# Patient Record
Sex: MALE | Race: WHITE | NOT HISPANIC OR LATINO | Employment: FULL TIME | URBAN - METROPOLITAN AREA
[De-identification: names, ages, dates, MRNs, and addresses within clinical notes are randomized per-mention and may not be internally consistent; named-entity substitution may affect disease eponyms.]

---

## 2020-10-31 ENCOUNTER — RECORDS - HEALTHEAST (OUTPATIENT)
Dept: LAB | Facility: CLINIC | Age: 50
End: 2020-10-31

## 2020-10-31 LAB
SARS-COV-2 PCR COMMENT: NORMAL
SARS-COV-2 RNA SPEC QL NAA+PROBE: NEGATIVE
SARS-COV-2 VIRUS SPECIMEN SOURCE: NORMAL

## 2021-08-04 ENCOUNTER — HOSPITAL ENCOUNTER (EMERGENCY)
Facility: HOSPITAL | Age: 51
Discharge: HOME | End: 2021-08-04
Attending: EMERGENCY MEDICINE | Admitting: EMERGENCY MEDICINE
Payer: COMMERCIAL

## 2021-08-04 ENCOUNTER — APPOINTMENT (OUTPATIENT)
Dept: RADIOLOGY | Facility: HOSPITAL | Age: 51
End: 2021-08-04
Attending: EMERGENCY MEDICINE
Payer: COMMERCIAL

## 2021-08-04 VITALS
BODY MASS INDEX: 43.42 KG/M2 | TEMPERATURE: 98.7 F | WEIGHT: 294 LBS | DIASTOLIC BLOOD PRESSURE: 81 MMHG | OXYGEN SATURATION: 95 % | SYSTOLIC BLOOD PRESSURE: 147 MMHG | RESPIRATION RATE: 25 BRPM | HEART RATE: 79 BPM

## 2021-08-04 DIAGNOSIS — S61.432A GUNSHOT WOUND OF LEFT HAND, INITIAL ENCOUNTER: ICD-10-CM

## 2021-08-04 DIAGNOSIS — S62.327A CLOSED DISPLACED FRACTURE OF SHAFT OF FIFTH METACARPAL BONE OF LEFT HAND, INITIAL ENCOUNTER: ICD-10-CM

## 2021-08-04 PROCEDURE — 99285 EMERGENCY DEPT VISIT HI MDM: CPT

## 2021-08-04 PROCEDURE — 250N000011 HC RX IP 250 OP 636: Performed by: EMERGENCY MEDICINE

## 2021-08-04 PROCEDURE — 29125 APPL SHORT ARM SPLINT STATIC: CPT | Mod: LT

## 2021-08-04 PROCEDURE — 99285 EMERGENCY DEPT VISIT HI MDM: CPT | Mod: 25

## 2021-08-04 PROCEDURE — 73130 X-RAY EXAM OF HAND: CPT | Mod: LT

## 2021-08-04 PROCEDURE — 250N000013 HC RX MED GY IP 250 OP 250 PS 637: Performed by: EMERGENCY MEDICINE

## 2021-08-04 PROCEDURE — 96372 THER/PROPH/DIAG INJ SC/IM: CPT | Performed by: EMERGENCY MEDICINE

## 2021-08-04 RX ORDER — ACETAMINOPHEN 325 MG/1
975 TABLET ORAL ONCE
Status: COMPLETED | OUTPATIENT
Start: 2021-08-04 | End: 2021-08-04

## 2021-08-04 RX ORDER — IBUPROFEN 200 MG
400 TABLET ORAL ONCE
Status: COMPLETED | OUTPATIENT
Start: 2021-08-04 | End: 2021-08-04

## 2021-08-04 RX ORDER — CEPHALEXIN 500 MG/1
500 CAPSULE ORAL 2 TIMES DAILY
Qty: 14 CAPSULE | Refills: 0 | Status: SHIPPED | OUTPATIENT
Start: 2021-08-04 | End: 2024-03-26

## 2021-08-04 RX ORDER — OXYCODONE HYDROCHLORIDE 5 MG/1
5 TABLET ORAL ONCE
Status: COMPLETED | OUTPATIENT
Start: 2021-08-04 | End: 2021-08-04

## 2021-08-04 RX ORDER — MORPHINE SULFATE 4 MG/ML
8 INJECTION, SOLUTION INTRAMUSCULAR; INTRAVENOUS ONCE
Status: COMPLETED | OUTPATIENT
Start: 2021-08-04 | End: 2021-08-04

## 2021-08-04 RX ORDER — OXYCODONE HYDROCHLORIDE 5 MG/1
5 TABLET ORAL EVERY 6 HOURS PRN
Qty: 12 TABLET | Refills: 0 | Status: SHIPPED | OUTPATIENT
Start: 2021-08-04 | End: 2021-08-07

## 2021-08-04 RX ADMIN — ACETAMINOPHEN 975 MG: 325 TABLET ORAL at 19:05

## 2021-08-04 RX ADMIN — OXYCODONE HYDROCHLORIDE 5 MG: 5 TABLET ORAL at 19:05

## 2021-08-04 RX ADMIN — MORPHINE SULFATE 8 MG: 4 INJECTION, SOLUTION INTRAMUSCULAR; INTRAVENOUS at 19:46

## 2021-08-04 RX ADMIN — IBUPROFEN 400 MG: 200 TABLET, FILM COATED ORAL at 19:05
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
## 2021-10-25 ENCOUNTER — OFFICE VISIT (OUTPATIENT)
Dept: FAMILY MEDICINE | Facility: CLINIC | Age: 51
End: 2021-10-25
Payer: COMMERCIAL

## 2021-10-25 VITALS
HEART RATE: 80 BPM | RESPIRATION RATE: 24 BRPM | DIASTOLIC BLOOD PRESSURE: 87 MMHG | SYSTOLIC BLOOD PRESSURE: 138 MMHG | WEIGHT: 306 LBS | BODY MASS INDEX: 45.32 KG/M2 | HEIGHT: 69 IN

## 2021-10-25 DIAGNOSIS — Z23 ENCOUNTER FOR IMMUNIZATION: Primary | ICD-10-CM

## 2021-10-25 PROCEDURE — 99202 OFFICE O/P NEW SF 15 MIN: CPT | Performed by: FAMILY MEDICINE

## 2021-10-25 RX ORDER — EPINEPHRINE 0.3 MG/.3ML
0.3 INJECTION SUBCUTANEOUS
COMMUNITY
Start: 2021-01-05 | End: 2024-06-24

## 2021-10-25 RX ORDER — ASPIRIN 81 MG
TABLET,CHEWABLE ORAL
COMMUNITY
Start: 2021-01-05 | End: 2024-03-05

## 2021-10-25 RX ORDER — ROSUVASTATIN CALCIUM 20 MG/1
20 TABLET, COATED ORAL
COMMUNITY
Start: 2021-01-13 | End: 2024-03-05

## 2021-10-25 RX ORDER — ATORVASTATIN CALCIUM 80 MG/1
TABLET, FILM COATED ORAL
COMMUNITY
Start: 2021-01-05 | End: 2024-03-22

## 2021-10-25 RX ORDER — METOPROLOL SUCCINATE 25 MG/1
TABLET, EXTENDED RELEASE ORAL
COMMUNITY
Start: 2021-01-05 | End: 2021-10-25

## 2021-10-25 RX ORDER — HYDROCHLOROTHIAZIDE 25 MG/1
25 TABLET ORAL
COMMUNITY
Start: 2021-01-07 | End: 2024-03-05

## 2021-10-25 RX ORDER — LOSARTAN POTASSIUM 100 MG/1
100 TABLET ORAL
COMMUNITY
Start: 2021-01-07 | End: 2024-03-05

## 2021-10-25 ASSESSMENT — MIFFLIN-ST. JEOR: SCORE: 2230.45

## 2021-10-27 NOTE — PROGRESS NOTES
"OFFICE VISIT - FAMILY MEDICINE     ASSESSMENT AND PLAN       ICD-10-CM    1. Encounter for immunization  Z23    New patient, he is here because he would like to get a COVID-19 exemption form,He works for [employer], COVID-19 vaccine has been mandated, I explained to him that I am just seeing him for the first time, I am not in a good position to make a judgment or feel up an exemption form, I offered him a consult from one of our allergist immunologist to determine if his history of allergies could be a contraindication from getting the COVID-19 vaccine, patient was disappointed to hear that, he stated that he is just going to  walk out of of work or   follow-up with his previous primary care physician at [facility] to discuss further.     CHIEF COMPLAINT   Immunization (needs update, and second covid. work with [employer])       HPI   Cayetano Palmer is a 51 year old male.  No Patient Care Coordination Note on file.    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  He is stating that he did get the COVID-19 vaccine first dose on December 21, 2020, he did get sick from the vaccine, he was having joint pain, body ache, on January 4 he was seen in the ER for chest pain and had a  heart attack, patient stating it was related to his COVID-19 vaccine.  He stated that his primary care physician told him not to get the second dose of the COVID-19 vaccine.  He also mentioned multiple allergies as he was growing up, has been seen by allergist, was told that he has environmental allergies, He does mention multiple incidents of \"weird episode of allergies\" where he skin turns warm and red, all of these since receiving the COVID-19 last December.  He does have an EpiPen.  Did not use " "the EpiPen after the COVID-19 vaccine.  He also has hypertension, hyperlipidemia, obstructive sleep apnea.    Review of Systems As per HPI, otherwise negative.    OBJECTIVE   /87 (BP Location: Left arm, Patient Position: Sitting, Cuff Size: Adult Large)   Pulse 80   Resp 24   Ht 1.74 m (5' 8.5\")   Wt 138.8 kg (306 lb)   BMI 45.85 kg/m    Physical Exam  Vitals and nursing note reviewed.     No physical exam performed today.    PFSH   No family history on file.  Social History     Socioeconomic History     Marital status:      Spouse name: Not on file     Number of children: Not on file     Years of education: Not on file     Highest education level: Not on file   Occupational History     Not on file   Tobacco Use     Smoking status: Current Every Day Smoker     Types: Cigarettes     Smokeless tobacco: Never Used   Substance and Sexual Activity     Alcohol use: Not on file     Drug use: Not on file     Sexual activity: Not on file   Other Topics Concern     Not on file   Social History Narrative     Not on file     Social Determinants of Health     Financial Resource Strain:      Difficulty of Paying Living Expenses:    Food Insecurity:      Worried About Running Out of Food in the Last Year:      Ran Out of Food in the Last Year:    Transportation Needs:      Lack of Transportation (Medical):      Lack of Transportation (Non-Medical):    Physical Activity:      Days of Exercise per Week:      Minutes of Exercise per Session:    Stress:      Feeling of Stress :    Social Connections:      Frequency of Communication with Friends and Family:      Frequency of Social Gatherings with Friends and Family:      Attends Jew Services:      Active Member of Clubs or Organizations:      Attends Club or Organization Meetings:      Marital Status:    Intimate Partner Violence:      Fear of Current or Ex-Partner:      Emotionally Abused:      Physically Abused:      Sexually Abused:        PMS "   [unfilled]  No past surgical history on file.    RESULTS/CONSULTS (Lab/Rad)   No results found for this or any previous visit (from the past 168 hour(s)).     [unfilled]    HEALTH MAINTENANCE / SCREENING   [unfilled], [unfilled],[unfilled]  Immunization History   Administered Date(s) Administered     COVID-19,PF,Pfizer 12/21/2020     FLU 6-35 months 11/02/2016     Flu, Unspecified 10/01/2018     Influenza (IIV3) PF 10/07/2015     Influenza Vaccine IM > 6 months Valent IIV4 (Alfuria,Fluzone) 09/27/2017     Tdap (Adult) Unspecified Formulation 09/18/2006     Health Maintenance   Topic     PREVENTIVE CARE VISIT      ANNUAL REVIEW OF  ORDERS      ADVANCE CARE PLANNING      Pneumococcal Vaccine: Pediatrics (0 to 5 Years) and At-Risk Patients (6 to 64 Years) (1 of 2 - PPSV23)     COLORECTAL CANCER SCREENING      HIV SCREENING      HEPATITIS C SCREENING      LIPID      DTAP/TDAP/TD IMMUNIZATION (2 - Td or Tdap)     COVID-19 Vaccine (2 - Pfizer 2-dose series)     INFLUENZA VACCINE (1)     ZOSTER IMMUNIZATION (1 of 2)     PHQ-2      IPV IMMUNIZATION      MENINGITIS IMMUNIZATION      HEPATITIS B IMMUNIZATION      Review of external notes as documented elsewhere in note  25 minutes spent on the date of the encounter doing chart review, review of outside records, review of test results, interpretation of tests, patient visit and documentation          Filipe Gary MD  [facility]   This transcription uses voice recognition software, which may contain typographical errors.

## 2024-02-26 ENCOUNTER — APPOINTMENT (OUTPATIENT)
Dept: RADIOLOGY | Facility: HOSPITAL | Age: 54
End: 2024-02-26
Attending: EMERGENCY MEDICINE

## 2024-02-26 ENCOUNTER — HOSPITAL ENCOUNTER (EMERGENCY)
Facility: HOSPITAL | Age: 54
Discharge: HOME | End: 2024-02-26
Attending: EMERGENCY MEDICINE | Admitting: EMERGENCY MEDICINE

## 2024-02-26 VITALS
TEMPERATURE: 98.2 F | BODY MASS INDEX: 45.32 KG/M2 | RESPIRATION RATE: 12 BRPM | HEART RATE: 76 BPM | SYSTOLIC BLOOD PRESSURE: 157 MMHG | OXYGEN SATURATION: 96 % | WEIGHT: 306 LBS | DIASTOLIC BLOOD PRESSURE: 90 MMHG | HEIGHT: 69 IN

## 2024-02-26 DIAGNOSIS — S99.921A INJURY OF TOE ON RIGHT FOOT, INITIAL ENCOUNTER: ICD-10-CM

## 2024-02-26 PROCEDURE — 99284 EMERGENCY DEPT VISIT MOD MDM: CPT | Mod: 25

## 2024-02-26 PROCEDURE — 250N000013 HC RX MED GY IP 250 OP 250 PS 637: Performed by: STUDENT IN AN ORGANIZED HEALTH CARE EDUCATION/TRAINING PROGRAM

## 2024-02-26 PROCEDURE — 73660 X-RAY EXAM OF TOE(S): CPT | Mod: LT

## 2024-02-26 PROCEDURE — 99284 EMERGENCY DEPT VISIT MOD MDM: CPT

## 2024-02-26 RX ORDER — ACETAMINOPHEN 325 MG/1
975 TABLET ORAL ONCE
Status: COMPLETED | OUTPATIENT
Start: 2024-02-26 | End: 2024-02-26

## 2024-02-26 RX ADMIN — ACETAMINOPHEN 975 MG: 325 TABLET ORAL at 11:30

## 2024-02-26 ASSESSMENT — COLUMBIA-SUICIDE SEVERITY RATING SCALE - C-SSRS
6. HAVE YOU EVER DONE ANYTHING, STARTED TO DO ANYTHING, OR PREPARED TO DO ANYTHING TO END YOUR LIFE?: NO
1. IN THE PAST MONTH, HAVE YOU WISHED YOU WERE DEAD OR WISHED YOU COULD GO TO SLEEP AND NOT WAKE UP?: NO
2. HAVE YOU ACTUALLY HAD ANY THOUGHTS OF KILLING YOURSELF IN THE PAST MONTH?: NO

## 2024-02-26 ASSESSMENT — ACTIVITIES OF DAILY LIVING (ADL): ADLS_ACUITY_SCORE: 35

## 2024-02-26 NOTE — ED PROVIDER NOTES
EMERGENCY DEPARTMENT ENCOUnter      NAME: Cayetano Palmer  AGE: 53 year old male  YOB: 1970  MRN: [id number]  EVALUATION DATE & TIME: 2024 11:23 AM    PCP: No Ref-Primary, Physician    ED PROVIDER: Kody Brewer DO      Chief Complaint   Patient presents with    Toe Injury         FINAL IMPRESSION:  1. Injury of toe on right foot, initial encounter          ED COURSE & MEDICAL DECISION MAKIN:30 AM I met with the patient for the initial interview and physical examination. Discussed plan for treatment and workup in the ED.     12:19 PM We discussed the plan for discharge and the patient is agreeable. Reviewed supportive cares, symptomatic treatment, outpatient follow up, and reasons to return to the Emergency Department. All questions and concerns were addressed. Patient to be discharged by ED RN.        The patient presented to the emergency department today after suffering a right great toe injury when something fell on it.  The nail was partially avulsed but the base of the nail was still intact and in place.  X-ray does not show any evidence of bony injury.  Plan will be to keep the nail in place and wrapped his toe on a bulky dressing.  He has been referred to podiatry for further evaluation in the next few days.  He is comfortable with this plan.        Medical Decision Making  Obtained supplemental history:Supplemental history obtained?: No  Reviewed external records: External records reviewed?: No  Care impacted by chronic illness:Hyperlipidemia, Hypertension, Smoking / Nicotine Use, and Other: NSTEMI  Care significantly affected by social determinants of health:N/A  Did you consider but not order tests?: Work up considered but not performed and documented in chart, if applicable  Did you interpret images independently?: Independent interpretation of ECG and images noted in documentation, when applicable.  Consultation discussion with other provider:Did you involve another provider  (consultant, MH, pharmacy, etc.)?: No  Discharge. No recommendations on prescription strength medication(s). See documentation for any additional details.    At the conclusion of the encounter I discussed the results of all of the tests and the disposition. The questions were answered. The patient or family acknowledged understanding and was agreeable with the care plan.         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 Notes he took his shoe off and pressed on the toe causing it to bleed. Denies additional injuries.           PAST MEDICAL HISTORY:  No past medical history on file.    PAST SURGICAL HISTORY:  No past surgical history on file.        CURRENT MEDICATIONS:    ASPIRIN LOW DOSE 81 MG chewable tablet  atorvastatin (LIPITOR) 80 MG tablet  cephALEXin (KEFLEX) 500 MG capsule  EPINEPHrine (ANY BX GENERIC EQUIV) 0.3 MG/0.3ML injection 2-pack  hydrochlorothiazide (HYDRODIURIL) 25 MG tablet  losartan (COZAAR) 100 MG tablet  rosuvastatin (CRESTOR) 20 MG tablet        ALLERGIES:  Allergies   Allergen Reactions    Metoprolol Itching     Itching started <1h after taking this med 2x       FAMILY HISTORY:  No family history on file.    SOCIAL HISTORY:   Social History     Socioeconomic History    Marital status:    Tobacco Use    Smoking status: Every Day     Types: Cigarettes    Smokeless tobacco: Never       VITALS:  Patient Vitals for the  "past 24 hrs:   BP Temp Temp src Pulse Resp SpO2 Height Weight   02/26/24 1215 (!) 150/88 -- -- 71 -- 96 % -- --   02/26/24 1120 (!) 181/116 -- -- -- -- -- -- --   02/26/24 1119 -- 98.2  F (36.8  C) Tympanic 85 12 95 % 1.753 m (5' 9\") 138.8 kg (306 lb)       PHYSICAL EXAM    Constitutional:  Well developed, Well nourished,  HENT:  Normocephalic, Atraumatic, Oropharynx moist, Nose normal.   Eyes:  EOMI, Conjunctiva normal, No discharge.   Respiratory:  Normal breath sounds, No respiratory distress, No wheezing, No chest tenderness.   Cardiovascular:  Normal heart rate, Normal rhythm, No murmurs  GI:  Soft, No tenderness, No guarding, No CVA tenderness.   Musculoskeletal: toenail of left great toe is partially avulsed but base of the nail is in place. Mild active bleeding  Extremities: No lower extremity edema.  Neurologic:  Alert & oriented x 3, No focal deficits noted.   Psychiatric:  Affect normal, Judgment normal, Mood normal.          RADIOLOGY:  I have independently reviewed and interpreted the above imaging, pending the final radiology read.  XR Toe Left G/E 2 Views   Preliminary Result   IMPRESSION: Mild osteoarthrosis of the 1st MTP joint. There is no evidence of fracture.            I, Paulina Booth, am serving as a scribe to document services personally performed by Dr. Brewer based on my observation and the provider's statements to me. IKody, DO attest that Paulina Booth is acting in a scribe capacity, has observed my performance of the services and has documented them in accordance with my direction.    Kody Brewer DO  Emergency Medicine  [facility] EMERGENCY DEPARTMENT  [address] [id number] [phone]  Dept: [phone]     Kody Brewer DO  02/26/24 1238    "

## 2024-02-26 NOTE — ED TRIAGE NOTES
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 Also has bleeding around the left big toe where the nail has pulled away. Last tetanus shot 2 yrs ago.

## 2024-02-26 NOTE — DISCHARGE INSTRUCTIONS
Fortunately your x-ray does not show any signs of fracture.  Follow-up with podiatry in the next several days for recheck and return to the ER for any worsening symptoms or other concerns.

## 2024-03-05 ENCOUNTER — OFFICE VISIT (OUTPATIENT)
Dept: FAMILY MEDICINE | Facility: CLINIC | Age: 54
End: 2024-03-05
Payer: COMMERCIAL

## 2024-03-05 ENCOUNTER — APPOINTMENT (OUTPATIENT)
Dept: RADIOLOGY | Facility: HOSPITAL | Age: 54
End: 2024-03-05
Attending: EMERGENCY MEDICINE
Payer: COMMERCIAL

## 2024-03-05 ENCOUNTER — HOSPITAL ENCOUNTER (EMERGENCY)
Facility: HOSPITAL | Age: 54
Discharge: HOME | End: 2024-03-05
Attending: EMERGENCY MEDICINE | Admitting: EMERGENCY MEDICINE
Payer: COMMERCIAL

## 2024-03-05 VITALS
RESPIRATION RATE: 24 BRPM | DIASTOLIC BLOOD PRESSURE: 117 MMHG | SYSTOLIC BLOOD PRESSURE: 185 MMHG | TEMPERATURE: 97.3 F | WEIGHT: 298.06 LBS | BODY MASS INDEX: 45.17 KG/M2 | HEART RATE: 87 BPM | OXYGEN SATURATION: 98 % | HEIGHT: 68 IN

## 2024-03-05 VITALS
SYSTOLIC BLOOD PRESSURE: 192 MMHG | RESPIRATION RATE: 18 BRPM | TEMPERATURE: 98.6 F | OXYGEN SATURATION: 96 % | DIASTOLIC BLOOD PRESSURE: 119 MMHG | BODY MASS INDEX: 44.02 KG/M2 | WEIGHT: 298.1 LBS | HEART RATE: 92 BPM

## 2024-03-05 DIAGNOSIS — R61 DIAPHORESIS: Primary | ICD-10-CM

## 2024-03-05 DIAGNOSIS — R73.9 HYPERGLYCEMIA: ICD-10-CM

## 2024-03-05 DIAGNOSIS — J15.9 COMMUNITY ACQUIRED BACTERIAL PNEUMONIA: ICD-10-CM

## 2024-03-05 DIAGNOSIS — I10 HYPERTENSION, UNSPECIFIED TYPE: ICD-10-CM

## 2024-03-05 LAB
ANION GAP SERPL CALCULATED.3IONS-SCNC: 11 MMOL/L (ref 7–15)
BASE EXCESS BLDV CALC-SCNC: 4.2 MMOL/L (ref -3–3)
BASOPHILS # BLD AUTO: 0.1 10E3/UL (ref 0–0.2)
BASOPHILS NFR BLD AUTO: 1 %
BUN SERPL-MCNC: 14.7 MG/DL (ref 6–20)
CALCIUM SERPL-MCNC: 8.7 MG/DL (ref 8.6–10)
CHLORIDE SERPL-SCNC: 103 MMOL/L (ref 98–107)
CREAT SERPL-MCNC: 0.86 MG/DL (ref 0.67–1.17)
DEPRECATED HCO3 PLAS-SCNC: 25 MMOL/L (ref 22–29)
EGFRCR SERPLBLD CKD-EPI 2021: >90 ML/MIN/1.73M2
EOSINOPHIL # BLD AUTO: 0.2 10E3/UL (ref 0–0.7)
EOSINOPHIL NFR BLD AUTO: 2 %
ERYTHROCYTE [DISTWIDTH] IN BLOOD BY AUTOMATED COUNT: 12.3 % (ref 10–15)
FLUAV RNA SPEC QL NAA+PROBE: NEGATIVE
FLUBV RNA RESP QL NAA+PROBE: NEGATIVE
GLUCOSE SERPL-MCNC: 265 MG/DL (ref 70–99)
HBA1C MFR BLD: 9.9 %
HCO3 BLDV-SCNC: 29 MMOL/L (ref 21–28)
HCT VFR BLD AUTO: 46.2 % (ref 40–53)
HGB BLD-MCNC: 16.1 G/DL (ref 13.3–17.7)
HOLD SPECIMEN: NORMAL
HOLD SPECIMEN: NORMAL
IMM GRANULOCYTES # BLD: 0 10E3/UL
IMM GRANULOCYTES NFR BLD: 0 %
LACTATE SERPL-SCNC: 1.5 MMOL/L (ref 0.7–2)
LYMPHOCYTES # BLD AUTO: 2.5 10E3/UL (ref 0.8–5.3)
LYMPHOCYTES NFR BLD AUTO: 24 %
MAGNESIUM SERPL-MCNC: 2 MG/DL (ref 1.7–2.3)
MCH RBC QN AUTO: 31.3 PG (ref 26.5–33)
MCHC RBC AUTO-ENTMCNC: 34.8 G/DL (ref 31.5–36.5)
MCV RBC AUTO: 90 FL (ref 78–100)
MONOCYTES # BLD AUTO: 1 10E3/UL (ref 0–1.3)
MONOCYTES NFR BLD AUTO: 9 %
NEUTROPHILS # BLD AUTO: 6.6 10E3/UL (ref 1.6–8.3)
NEUTROPHILS NFR BLD AUTO: 64 %
NRBC # BLD AUTO: 0 10E3/UL
NRBC BLD AUTO-RTO: 0 /100
NT-PROBNP SERPL-MCNC: 903 PG/ML (ref 0–900)
O2/TOTAL GAS SETTING VFR VENT: 21 %
OXYHGB MFR BLDV: 69 % (ref 70–75)
PCO2 BLDV: 45 MM HG (ref 40–50)
PH BLDV: 7.41 [PH] (ref 7.32–7.43)
PLATELET # BLD AUTO: 184 10E3/UL (ref 150–450)
PO2 BLDV: 36 MM HG (ref 25–47)
POTASSIUM SERPL-SCNC: 3.8 MMOL/L (ref 3.4–5.3)
PROCALCITONIN SERPL IA-MCNC: 0.09 NG/ML
RBC # BLD AUTO: 5.15 10E6/UL (ref 4.4–5.9)
RSV RNA SPEC NAA+PROBE: NEGATIVE
SAO2 % BLDV: 72.6 % (ref 70–75)
SARS-COV-2 RNA RESP QL NAA+PROBE: NEGATIVE
SODIUM SERPL-SCNC: 139 MMOL/L (ref 135–145)
TROPONIN T SERPL HS-MCNC: 51 NG/L
TROPONIN T SERPL HS-MCNC: 54 NG/L
WBC # BLD AUTO: 10.4 10E3/UL (ref 4–11)

## 2024-03-05 PROCEDURE — 93005 ELECTROCARDIOGRAM TRACING: CPT | Performed by: EMERGENCY MEDICINE

## 2024-03-05 PROCEDURE — 250N000013 HC RX MED GY IP 250 OP 250 PS 637: Performed by: EMERGENCY MEDICINE

## 2024-03-05 PROCEDURE — 85025 COMPLETE CBC W/AUTO DIFF WBC: CPT | Performed by: EMERGENCY MEDICINE

## 2024-03-05 PROCEDURE — 36415 COLL VENOUS BLD VENIPUNCTURE: CPT | Performed by: EMERGENCY MEDICINE

## 2024-03-05 PROCEDURE — 99215 OFFICE O/P EST HI 40 MIN: CPT | Performed by: PHYSICIAN ASSISTANT

## 2024-03-05 PROCEDURE — 83605 ASSAY OF LACTIC ACID: CPT | Performed by: EMERGENCY MEDICINE

## 2024-03-05 PROCEDURE — 87040 BLOOD CULTURE FOR BACTERIA: CPT | Performed by: EMERGENCY MEDICINE

## 2024-03-05 PROCEDURE — 80048 BASIC METABOLIC PNL TOTAL CA: CPT | Performed by: EMERGENCY MEDICINE

## 2024-03-05 PROCEDURE — 87637 SARSCOV2&INF A&B&RSV AMP PRB: CPT | Performed by: EMERGENCY MEDICINE

## 2024-03-05 PROCEDURE — 84484 ASSAY OF TROPONIN QUANT: CPT | Performed by: EMERGENCY MEDICINE

## 2024-03-05 PROCEDURE — 83735 ASSAY OF MAGNESIUM: CPT | Performed by: EMERGENCY MEDICINE

## 2024-03-05 PROCEDURE — 99284 EMERGENCY DEPT VISIT MOD MDM: CPT | Mod: 25

## 2024-03-05 PROCEDURE — 84145 PROCALCITONIN (PCT): CPT | Performed by: EMERGENCY MEDICINE

## 2024-03-05 PROCEDURE — 93005 ELECTROCARDIOGRAM TRACING: CPT | Performed by: PHYSICIAN ASSISTANT

## 2024-03-05 PROCEDURE — 71046 X-RAY EXAM CHEST 2 VIEWS: CPT

## 2024-03-05 PROCEDURE — 83036 HEMOGLOBIN GLYCOSYLATED A1C: CPT | Performed by: EMERGENCY MEDICINE

## 2024-03-05 PROCEDURE — 82805 BLOOD GASES W/O2 SATURATION: CPT | Performed by: EMERGENCY MEDICINE

## 2024-03-05 PROCEDURE — 93010 ELECTROCARDIOGRAM REPORT: CPT | Performed by: INTERNAL MEDICINE

## 2024-03-05 PROCEDURE — 83880 ASSAY OF NATRIURETIC PEPTIDE: CPT | Performed by: EMERGENCY MEDICINE

## 2024-03-05 RX ORDER — DOXYCYCLINE 100 MG/1
100 CAPSULE ORAL 2 TIMES DAILY
Qty: 20 CAPSULE | Refills: 0 | Status: SHIPPED | OUTPATIENT
Start: 2024-03-05 | End: 2024-03-15

## 2024-03-05 RX ORDER — ASPIRIN 81 MG
81 TABLET,CHEWABLE ORAL DAILY
Qty: 20 TABLET | Refills: 0 | Status: SHIPPED | OUTPATIENT
Start: 2024-03-05 | End: 2024-03-22

## 2024-03-05 RX ORDER — HYDROCHLOROTHIAZIDE 25 MG/1
25 TABLET ORAL DAILY
Qty: 20 TABLET | Refills: 0 | Status: SHIPPED | OUTPATIENT
Start: 2024-03-05 | End: 2024-03-22

## 2024-03-05 RX ORDER — LOSARTAN POTASSIUM 100 MG/1
100 TABLET ORAL DAILY
Qty: 20 TABLET | Refills: 0 | Status: SHIPPED | OUTPATIENT
Start: 2024-03-05 | End: 2024-03-22

## 2024-03-05 RX ORDER — ROSUVASTATIN CALCIUM 20 MG/1
20 TABLET, COATED ORAL DAILY
Qty: 20 TABLET | Refills: 0 | Status: SHIPPED | OUTPATIENT
Start: 2024-03-05 | End: 2024-03-22

## 2024-03-05 RX ORDER — DOXYCYCLINE 100 MG/1
100 CAPSULE ORAL ONCE
Status: COMPLETED | OUTPATIENT
Start: 2024-03-05 | End: 2024-03-05

## 2024-03-05 RX ADMIN — AMOXICILLIN AND CLAVULANATE POTASSIUM 1 TABLET: 875; 125 TABLET, FILM COATED ORAL at 20:35

## 2024-03-05 RX ADMIN — DOXYCYCLINE HYCLATE 100 MG: 100 CAPSULE ORAL at 20:35

## 2024-03-05 ASSESSMENT — ACTIVITIES OF DAILY LIVING (ADL)
ADLS_ACUITY_SCORE: 33
ADLS_ACUITY_SCORE: 35

## 2024-03-05 ASSESSMENT — COLUMBIA-SUICIDE SEVERITY RATING SCALE - C-SSRS
6. HAVE YOU EVER DONE ANYTHING, STARTED TO DO ANYTHING, OR PREPARED TO DO ANYTHING TO END YOUR LIFE?: NO
2. HAVE YOU ACTUALLY HAD ANY THOUGHTS OF KILLING YOURSELF IN THE PAST MONTH?: NO
1. IN THE PAST MONTH, HAVE YOU WISHED YOU WERE DEAD OR WISHED YOU COULD GO TO SLEEP AND NOT WAKE UP?: NO

## 2024-03-05 NOTE — PROGRESS NOTES
Patient presents with:  Cough: X over 2wks, productive cough, slight wheezing, no chest pain, congestion, no runny nose, no fever      Clinical Decision Making:  Given patient's shortness of breath and diaphoresis I recommend that he be evaluated at the emergency department for cardiac workup.  They will also be able to do chest x-ray to evaluate for pneumonia.  EKG showed no signs of ST elevation, but did have some nonspecific T wave abnormalities.  Report given to ED provider prior to the patient's arrival.  Patient is agreeable to this plan.      ICD-10-CM    1. Diaphoresis  R61 EKG 12-lead, tracing only          There are no Patient Instructions on file for this visit.    HPI:  Cayetano Palmer is a 53 year old male who presents today complaining of cough and shortness of breath for the past 2 weeks.  Patient denies any nasal congestion, runny nose, chest pain, or fever.  He does get out of breath more recently than normal and feels like he cannot catch his breath.  In 2013 he had a pneumonia and this feels somewhat similar.  At that time he needed a thoracotomy.  About 3 years ago patient also had some neck pain and arm pain that was potentially concerning for cardiac event.  At that time he had borderline troponin and echo was normal.  Other than that he has not had any    History obtained from the patient.    Problem List:  There are no relevant problems documented for this patient.      No past medical history on file.    Social History     Tobacco Use    Smoking status: Every Day     Types: Cigarettes    Smokeless tobacco: Never   Substance Use Topics    Alcohol use: Not on file       Review of Systems    Vitals:    03/05/24 1705   BP: (!) 192/119   Pulse: 92   Resp: 18   Temp: 98.6  F (37  C)   TempSrc: Oral   SpO2: 96%   Weight: 135.2 kg (298 lb 1.6 oz)       Physical Exam  Vitals and nursing note reviewed.   Constitutional:       General: He is not in acute distress.     Appearance: He is diaphoretic  (Worsened as he is talking for long period of time.). He is not toxic-appearing.      Comments: Patient seems to get more out of breath as he talks for longer periods of time.   HENT:      Head: Normocephalic and atraumatic.      Right Ear: External ear normal.      Left Ear: External ear normal.   Eyes:      Conjunctiva/sclera: Conjunctivae normal.   Cardiovascular:      Rate and Rhythm: Normal rate and regular rhythm.      Heart sounds: No murmur heard.  Pulmonary:      Effort: Pulmonary effort is normal. No respiratory distress.      Breath sounds: No stridor. No wheezing, rhonchi or rales.   Neurological:      Mental Status: He is alert.   Psychiatric:         Mood and Affect: Mood normal.         Behavior: Behavior normal.         Thought Content: Thought content normal.         Judgment: Judgment normal.         Results:  Results for orders placed or performed in visit on 03/05/24   EKG 12-lead, tracing only     Status: None (Preliminary result)   Result Value Ref Range    Systolic Blood Pressure  mmHg    Diastolic Blood Pressure  mmHg    Ventricular Rate 93 BPM    Atrial Rate 93 BPM    KS Interval 174 ms    QRS Duration 108 ms     ms    QTc 487 ms    P Axis 30 degrees    R AXIS -72 degrees    T Axis 96 degrees    Interpretation ECG       Sinus rhythm with Premature atrial complexes with Aberrant conduction  Left axis deviation  Inferior infarct , age undetermined  T wave abnormality, consider lateral ischemia  Abnormal ECG  No previous ECGs available           At the end of the encounter, I discussed results, diagnosis, medications. Discussed red flags for immediate return to clinic/ER, as well as indications for follow up if no improvement. Patient understood and agreed to plan. Patient was stable for discharge.

## 2024-03-05 NOTE — ED NOTES
"Expected Patient Referral to ED  5:46 PM    Referring Clinic/Provider:  Walk in clinic [facility]    Reason for referral/Clinical facts:  Cough with SOB, urgent care provider doesn't know if EKG is normal or abnormal, patient with some sweating, /119, \"I didn't do more workup other than the EKG here\".    Recommendations provided:  Send to ED for further evaluation    Caller was informed that this institution does possess the capabilities and/or resources to provide for patient and should be transferred to our facility.    Discussed that if direct admit is sought and any hurdles are encountered, this ED would be happy to see the patient and evaluate.    Informed caller that recommendations provided are recommendations based only on the facts provided and that they responsible to accept or reject the advice, or to seek a formal in person consultation as needed and that this ED will see/treat patient should they arrive.      Vanna Comer MD  [facility] EMERGENCY DEPARTMENT  [address] [place] [id number]  [phone]       Vanna Comer MD  03/05/24 4073    "

## 2024-03-06 LAB
ATRIAL RATE - MUSE: 93 BPM
DIASTOLIC BLOOD PRESSURE - MUSE: NORMAL MMHG
INTERPRETATION ECG - MUSE: NORMAL
P AXIS - MUSE: 30 DEGREES
PR INTERVAL - MUSE: 174 MS
QRS DURATION - MUSE: 108 MS
QT - MUSE: 392 MS
QTC - MUSE: 487 MS
R AXIS - MUSE: -72 DEGREES
SYSTOLIC BLOOD PRESSURE - MUSE: NORMAL MMHG
T AXIS - MUSE: 96 DEGREES
VENTRICULAR RATE- MUSE: 93 BPM

## 2024-03-06 NOTE — ED TRIAGE NOTES
Pt reports being sent here from , not really sure why.  Pt denies CP, states he has been having some SOB and a cough.  Pt went to  for concern for PNA.  Pt states he is supposed to be taking BP medications but hasn't for 3 months as his clinic closed.       Triage Assessment (Adult)       Row Name 03/05/24 1050          Triage Assessment    Airway WDL WDL        Respiratory WDL    Respiratory WDL X;rhythm/pattern     Rhythm/Pattern, Respiratory shortness of breath;tachypneic        Skin Circulation/Temperature WDL    Skin Circulation/Temperature WDL WDL        Cardiac WDL    Cardiac WDL WDL        Peripheral/Neurovascular WDL    Peripheral Neurovascular WDL WDL        Cognitive/Neuro/Behavioral WDL    Cognitive/Neuro/Behavioral WDL WDL        Destiny Coma Scale    Best Eye Response 4-->(E4) spontaneous     Best Motor Response 6-->(M6) obeys commands     Best Verbal Response 5-->(V5) oriented     Destiny Coma Scale Score 15

## 2024-03-06 NOTE — ED PROVIDER NOTES
"EMERGENCY DEPARTMENT ENCOUNTER      NAME: Cayetano Palmer  AGE: 53 year old male  YOB: 1970  MRN: [id number]  EVALUATION DATE & TIME: No admission date for patient encounter.    PCP: No Ref-Primary, Physician    ED PROVIDER: Vanna Comer M.D.      Chief Complaint   Patient presents with    Cough    Breathing Problem         FINAL IMPRESSION:  6:44 PM I met with the patient, obtained history, performed an initial exam, and discussed options and plan for diagnostics and treatment here in the ED.  1. Hypertension, unspecified type    2. Hyperglycemia    3. Community acquired bacterial pneumonia          ED COURSE & MEDICAL DECISION MAKING:    ED Course as of 03/05/24 2107   Tue Mar 05, 2024   1845 Patient reports 2 weeks \"coughing up all sorts of junk\" with normal VS, normal venous blood pH, normal WBC, normal lactic acid, could not catch his full breath today while going up the stairs.  EKG reassuring, pending troponin ACS r/o, home covid19 test negative. CXR independently interpreted by me with spine sign seen with possible CAP, awaiting radiology read. No diaphoresis or appearance of instability here at this time reassuringly. No chest pain at all and no SOB right now, EKG WNL with patient sent from urgent care because urgent care did not know how to interpret EKG, called me to note not sure how to interpret, by review of EKG WNL reassuringly.    1912 Troponin at 1813 = 54, repeat 2h delta troponin ordered at 2015 for ACS r/o. Patient with known DM2 with glucose 265 without signs of DKA with CO2 = 25 and anion gap = 11   1943 Xr with possible atypical PNA, begun on doxycycline antibiotic therapy with augmentin for CAP   2048 Patient reassessed and feeling improved, repeat troponin underway now for ACS r/o and patient engaged in shared clinical decision making conversation and ok with plan to f/u with  clinical referral primary care service and additionally to begin metformin for DM2 with a1c " pending, f/u with PMD for glucose rechecks, resume antihypertensive therapy and statin and daily ASA, with Rx to preferred pharmacy for that along with antibiotic therapy for CAP.    2106 Troponin 54 -> 51 therefore reassuringly ACS r/o. Patient discharged after being provided with extensive anticipatory guidance and given return precautions, importance of PMD follow-up emphasized.        Medical Decision Making  Obtained supplemental history:Supplemental history obtained?: Documented in chart  Reviewed external records: External records reviewed?: Documented in chart  Care impacted by chronic illness:Heart Disease, Hyperlipidemia, Hypertension, Smoking / Nicotine Use, and Other: NSTEMI, prediabetes  Care significantly affected by social determinants of health:N/A  Did you consider but not order tests?: Work up considered but not performed and documented in chart, if applicable  Did you interpret images independently?: Independent interpretation of ECG and images noted in documentation, when applicable.  Consultation discussion with other provider:Did you involve another provider (consultant, , pharmacy, etc.)?: No  Discharge. I prescribed additional prescription strength medication(s) as charted. I considered admission, but discharged patient after significant clinical improvement.      At the conclusion of the encounter I discussed the results of all of the tests and the disposition. The questions were answered. The patient or family acknowledged understanding and was agreeable with the care plan.     MEDICATIONS GIVEN IN THE EMERGENCY:  Medications   amoxicillin-clavulanate (AUGMENTIN) 875-125 MG per tablet 1 tablet (1 tablet Oral $Given 3/5/24 2035)   doxycycline hyclate (VIBRAMYCIN) capsule 100 mg (100 mg Oral $Given 3/5/24 2035)       NEW PRESCRIPTIONS STARTED AT TODAY'S ER VISIT  New Prescriptions    AMOXICILLIN-CLAVULANATE (AUGMENTIN) 875-125 MG TABLET    Take 1 tablet by mouth 2 times daily for 10 days     DOXYCYCLINE HYCLATE (VIBRAMYCIN) 100 MG CAPSULE    Take 1 capsule (100 mg) by mouth 2 times daily for 10 days    METFORMIN (GLUCOPHAGE) 500 MG TABLET    Take 1 tablet (500 mg) by mouth 2 times daily (with meals)          =================================================================    Lists of hospitals in the [place]      Cayetano Palmer is a 53 year old male with PMHx of hypertension, hyperlipidemia, morbid obesity, tobacco abuse, NSTEMI, and obstructive sleep apnea who presents to the ED today via private vehicle with cough and difficulty breathing.    Per chart review, patient visited [facility] on 03- for evaluation of cough, shortness of breath, and slight wheezing. Patient recommended to be evaluated at ED for cardiac workup, given patient's shortness of breath and diaphoresis. Chest x-ray to be done for evaluation for pneumonia. EKG showed no signs of ST elevation, but had some nonspecific T wave abnormalities.  Provider at urgent care spoke with ED provide prior to patient's arrival. Notes that the patient has a cough, and looks sweating. Was unsure how to interpret EKG.    Patient reports having a cough for the past 2 weeks. He is concerned that his symptoms could be pneumoni. He notes that he has intermittent coughing fits. He reports that he had difficulty breathing after going up 5 steps and walking 50 yards today.    Patient denies any recent fever, chest pain, or leg swelling. He also denies any recent illness exposure.            REVIEW OF SYSTEMS   All other systems reviewed and are negative except as noted above in HPI.    PAST MEDICAL HISTORY:  No past medical history on file.    PAST SURGICAL HISTORY:  No past surgical history on file.    CURRENT MEDICATIONS:    amoxicillin-clavulanate (AUGMENTIN) 875-125 MG tablet  ASPIRIN LOW DOSE 81 MG chewable tablet  doxycycline hyclate (VIBRAMYCIN) 100 MG capsule  hydrochlorothiazide (HYDRODIURIL) 25 MG tablet  losartan (COZAAR) 100 MG tablet  metFORMIN  "(GLUCOPHAGE) 500 MG tablet  rosuvastatin (CRESTOR) 20 MG tablet  atorvastatin (LIPITOR) 80 MG tablet  cephALEXin (KEFLEX) 500 MG capsule  EPINEPHrine (ANY BX GENERIC EQUIV) 0.3 MG/0.3ML injection 2-pack        ALLERGIES:  Allergies   Allergen Reactions    Metoprolol Itching     Itching started <1h after taking this med 2x       FAMILY HISTORY:  No family history on file.    SOCIAL HISTORY:   Social History     Socioeconomic History    Marital status:    Tobacco Use    Smoking status: Every Day     Types: Cigarettes    Smokeless tobacco: Never       VITALS:  Patient Vitals for the past 24 hrs:   BP Temp Temp src Pulse Resp SpO2 Height Weight   03/05/24 1900 (!) 186/120 -- -- -- -- -- -- --   03/05/24 1800 (!) 193/107 97.3  F (36.3  C) Temporal 100 24 93 % 1.727 m (5' 8\") 135.2 kg (298 lb 1 oz)       PHYSICAL EXAM    GENERAL: Awake, alert.  In no acute distress.   HEENT: Normocephalic, atraumatic.  Pupils equal, round and reactive.  Conjunctiva normal.  EOMI.  NECK: No stridor or apparent deformity.  PULMONARY: Symmetrical breath sounds without distress.  Lungs clear to auscultation bilaterally without wheezes, rhonchi or rales.  CARDIO: Regular rate and rhythm.  No significant murmur, rub or gallop.  Radial pulses strong and symmetrical.  ABDOMINAL: Abdomen soft, non-distended and non-tender to palpation.  No CVAT, no palpable hepatosplenomegaly.  EXTREMITIES: No lower extremity swelling or edema.    NEURO: Alert and oriented to person, place and time.  Cranial nerves grossly intact.  No focal motor deficit.  PSYCH: Normal mood and affect  SKIN: No rashes      LAB:  All pertinent labs reviewed and interpreted.  Results for orders placed or performed during the hospital encounter of 03/05/24   XR Chest 2 Views    Impression    IMPRESSION:     Diffusely increased interstitial markings throughout the lungs, which could reflect interstitial edema and/or atypical infection. No focal airspace opacities. No pleural " effusions or pneumothorax.    Borderline enlarged cardiac silhouette.   Basic metabolic panel   Result Value Ref Range    Sodium 139 135 - 145 mmol/L    Potassium 3.8 3.4 - 5.3 mmol/L    Chloride 103 98 - 107 mmol/L    Carbon Dioxide (CO2) 25 22 - 29 mmol/L    Anion Gap 11 7 - 15 mmol/L    Urea Nitrogen 14.7 6.0 - 20.0 mg/dL    Creatinine 0.86 0.67 - 1.17 mg/dL    GFR Estimate >90 >60 mL/min/1.73m2    Calcium 8.7 8.6 - 10.0 mg/dL    Glucose 265 (H) 70 - 99 mg/dL   Result Value Ref Range    Troponin T, High Sensitivity 54 (H) <=22 ng/L   CBC with platelets and differential   Result Value Ref Range    WBC Count 10.4 4.0 - 11.0 10e3/uL    RBC Count 5.15 4.40 - 5.90 10e6/uL    Hemoglobin 16.1 13.3 - 17.7 g/dL    Hematocrit 46.2 40.0 - 53.0 %    MCV 90 78 - 100 fL    MCH 31.3 26.5 - 33.0 pg    MCHC 34.8 31.5 - 36.5 g/dL    RDW 12.3 10.0 - 15.0 %    Platelet Count 184 150 - 450 10e3/uL    % Neutrophils 64 %    % Lymphocytes 24 %    % Monocytes 9 %    % Eosinophils 2 %    % Basophils 1 %    % Immature Granulocytes 0 %    NRBCs per 100 WBC 0 <1 /100    Absolute Neutrophils 6.6 1.6 - 8.3 10e3/uL    Absolute Lymphocytes 2.5 0.8 - 5.3 10e3/uL    Absolute Monocytes 1.0 0.0 - 1.3 10e3/uL    Absolute Eosinophils 0.2 0.0 - 0.7 10e3/uL    Absolute Basophils 0.1 0.0 - 0.2 10e3/uL    Absolute Immature Granulocytes 0.0 <=0.4 10e3/uL    Absolute NRBCs 0.0 10e3/uL   Extra Blue Top Tube   Result Value Ref Range    Hold Specimen JIC    Extra Red Top Tube   Result Value Ref Range    Hold Specimen JIC    Result Value Ref Range    Procalcitonin 0.09 <0.50 ng/mL   Lactic acid whole blood   Result Value Ref Range    Lactic Acid 1.5 0.7 - 2.0 mmol/L   Result Value Ref Range    Magnesium 2.0 1.7 - 2.3 mg/dL   Nt probnp inpatient (BNP)   Result Value Ref Range    N terminal Pro BNP Inpatient 903 (H) 0 - 900 pg/mL   Symptomatic Influenza A/B, RSV, & SARS-CoV2 PCR (COVID-19) Nasopharyngeal    Specimen: Nasopharyngeal; Swab   Result Value Ref Range     Influenza A PCR Negative Negative    Influenza B PCR Negative Negative    RSV PCR Negative Negative    SARS CoV2 PCR Negative Negative   Blood gas venous   Result Value Ref Range    pH Venous 7.41 7.32 - 7.43    pCO2 Venous 45 40 - 50 mm Hg    pO2 Venous 36 25 - 47 mm Hg    Bicarbonate Venous 29 (H) 21 - 28 mmol/L    Base Excess/Deficit Venous 4.2 (H) -3.0 - 3.0 mmol/L    FIO2 21     Oxyhemoglobin Venous 69 (L) 70 - 75 %    O2 Sat, Venous 72.6 70.0 - 75.0 %   Result Value Ref Range    Troponin T, High Sensitivity 51 (H) <=22 ng/L   Result Value Ref Range    Hemoglobin A1C 9.9 (H) <5.7 %       RADIOLOGY:  Reviewed all pertinent imaging. Please see official radiology report.  XR Chest 2 Views   Final Result   IMPRESSION:       Diffusely increased interstitial markings throughout the lungs, which could reflect interstitial edema and/or atypical infection. No focal airspace opacities. No pleural effusions or pneumothorax.      Borderline enlarged cardiac silhouette.            EKG:    Reviewed and interpreted as:   03-  18:06  90 bpm  Sinus rhythm  No ST abnormalities  No EKG prior      I have independently reviewed and interpreted the EKG(s) documented above.        I, ESE ORY , am serving as a scribe to document services personally performed by Dr. Vanna Comer based on my observation and the provider's statements to me. I, Vanna Comer MD attest that ESE ROY  is acting in a scribe capacity, has observed my performance of the services and has documented them in accordance with my direction.       Vanna Comer MD  03/05/24 1456

## 2024-03-06 NOTE — DISCHARGE INSTRUCTIONS
In addition to re-prescribing your blood pressure and cholesterol medicine in the ER today, we are also starting you on metformin, a medication for diabetes, because you have diabetes. Our primary care medical scheduling team will call you this week to help you set up a follow up appointment so they can make sure you are feeling improved, rapidly improving, and tolerating these medications well and so they can recheck your blood pressure and cholesterol and blood sugar in their office.

## 2024-03-10 LAB
BACTERIA BLD CULT: NO GROWTH
BACTERIA BLD CULT: NO GROWTH

## 2024-03-14 ENCOUNTER — HOSPITAL ENCOUNTER (EMERGENCY)
Facility: HOSPITAL | Age: 54
Discharge: HOME | End: 2024-03-14
Attending: EMERGENCY MEDICINE | Admitting: EMERGENCY MEDICINE
Payer: COMMERCIAL

## 2024-03-14 ENCOUNTER — APPOINTMENT (OUTPATIENT)
Dept: RADIOLOGY | Facility: HOSPITAL | Age: 54
End: 2024-03-14
Attending: EMERGENCY MEDICINE
Payer: COMMERCIAL

## 2024-03-14 ENCOUNTER — APPOINTMENT (OUTPATIENT)
Dept: CT IMAGING | Facility: HOSPITAL | Age: 54
End: 2024-03-14
Attending: EMERGENCY MEDICINE
Payer: COMMERCIAL

## 2024-03-14 VITALS
DIASTOLIC BLOOD PRESSURE: 96 MMHG | OXYGEN SATURATION: 98 % | HEART RATE: 91 BPM | RESPIRATION RATE: 21 BRPM | TEMPERATURE: 97.8 F | BODY MASS INDEX: 44.86 KG/M2 | SYSTOLIC BLOOD PRESSURE: 120 MMHG | HEIGHT: 68 IN | WEIGHT: 296 LBS

## 2024-03-14 DIAGNOSIS — R07.89 ATYPICAL CHEST PAIN: ICD-10-CM

## 2024-03-14 DIAGNOSIS — I48.91 NEW ONSET ATRIAL FIBRILLATION (H): ICD-10-CM

## 2024-03-14 LAB
ALBUMIN SERPL BCG-MCNC: 3.8 G/DL (ref 3.5–5.2)
ALP SERPL-CCNC: 75 U/L (ref 40–150)
ALT SERPL W P-5'-P-CCNC: 43 U/L (ref 0–70)
ANION GAP SERPL CALCULATED.3IONS-SCNC: 12 MMOL/L (ref 7–15)
APTT PPP: 31 SECONDS (ref 22–38)
AST SERPL W P-5'-P-CCNC: 37 U/L (ref 0–45)
ATRIAL RATE - MUSE: 416 BPM
BASOPHILS # BLD AUTO: 0.1 10E3/UL (ref 0–0.2)
BASOPHILS NFR BLD AUTO: 1 %
BILIRUB SERPL-MCNC: 0.4 MG/DL
BUN SERPL-MCNC: 18 MG/DL (ref 6–20)
CALCIUM SERPL-MCNC: 9.4 MG/DL (ref 8.6–10)
CHLORIDE SERPL-SCNC: 102 MMOL/L (ref 98–107)
CREAT SERPL-MCNC: 0.91 MG/DL (ref 0.67–1.17)
DEPRECATED HCO3 PLAS-SCNC: 26 MMOL/L (ref 22–29)
DIASTOLIC BLOOD PRESSURE - MUSE: NORMAL MMHG
EGFRCR SERPLBLD CKD-EPI 2021: >90 ML/MIN/1.73M2
EOSINOPHIL # BLD AUTO: 0.3 10E3/UL (ref 0–0.7)
EOSINOPHIL NFR BLD AUTO: 3 %
ERYTHROCYTE [DISTWIDTH] IN BLOOD BY AUTOMATED COUNT: 12.3 % (ref 10–15)
GLUCOSE SERPL-MCNC: 313 MG/DL (ref 70–99)
HCT VFR BLD AUTO: 46.9 % (ref 40–53)
HGB BLD-MCNC: 16.3 G/DL (ref 13.3–17.7)
HOLD SPECIMEN: NORMAL
IMM GRANULOCYTES # BLD: 0 10E3/UL
IMM GRANULOCYTES NFR BLD: 0 %
INR PPP: 1.1 (ref 0.85–1.15)
INTERPRETATION ECG - MUSE: NORMAL
LYMPHOCYTES # BLD AUTO: 3 10E3/UL (ref 0.8–5.3)
LYMPHOCYTES NFR BLD AUTO: 27 %
MAGNESIUM SERPL-MCNC: 1.9 MG/DL (ref 1.7–2.3)
MCH RBC QN AUTO: 31.1 PG (ref 26.5–33)
MCHC RBC AUTO-ENTMCNC: 34.8 G/DL (ref 31.5–36.5)
MCV RBC AUTO: 90 FL (ref 78–100)
MONOCYTES # BLD AUTO: 0.9 10E3/UL (ref 0–1.3)
MONOCYTES NFR BLD AUTO: 8 %
NEUTROPHILS # BLD AUTO: 6.8 10E3/UL (ref 1.6–8.3)
NEUTROPHILS NFR BLD AUTO: 61 %
NRBC # BLD AUTO: 0 10E3/UL
NRBC BLD AUTO-RTO: 0 /100
P AXIS - MUSE: NORMAL DEGREES
PLATELET # BLD AUTO: 217 10E3/UL (ref 150–450)
POTASSIUM SERPL-SCNC: 4 MMOL/L (ref 3.4–5.3)
PR INTERVAL - MUSE: NORMAL MS
PROT SERPL-MCNC: 7.2 G/DL (ref 6.4–8.3)
QRS DURATION - MUSE: 108 MS
QT - MUSE: 346 MS
QTC - MUSE: 482 MS
R AXIS - MUSE: -84 DEGREES
RBC # BLD AUTO: 5.24 10E6/UL (ref 4.4–5.9)
SODIUM SERPL-SCNC: 140 MMOL/L (ref 135–145)
SYSTOLIC BLOOD PRESSURE - MUSE: NORMAL MMHG
T AXIS - MUSE: 89 DEGREES
TROPONIN T SERPL HS-MCNC: 49 NG/L
TROPONIN T SERPL HS-MCNC: 51 NG/L
VENTRICULAR RATE- MUSE: 117 BPM
WBC # BLD AUTO: 11.1 10E3/UL (ref 4–11)

## 2024-03-14 PROCEDURE — 84484 ASSAY OF TROPONIN QUANT: CPT | Performed by: EMERGENCY MEDICINE

## 2024-03-14 PROCEDURE — 71275 CT ANGIOGRAPHY CHEST: CPT

## 2024-03-14 PROCEDURE — 85610 PROTHROMBIN TIME: CPT | Performed by: EMERGENCY MEDICINE

## 2024-03-14 PROCEDURE — 36415 COLL VENOUS BLD VENIPUNCTURE: CPT | Performed by: EMERGENCY MEDICINE

## 2024-03-14 PROCEDURE — 93005 ELECTROCARDIOGRAM TRACING: CPT | Performed by: STUDENT IN AN ORGANIZED HEALTH CARE EDUCATION/TRAINING PROGRAM

## 2024-03-14 PROCEDURE — 93005 ELECTROCARDIOGRAM TRACING: CPT | Performed by: EMERGENCY MEDICINE

## 2024-03-14 PROCEDURE — 250N000013 HC RX MED GY IP 250 OP 250 PS 637: Performed by: EMERGENCY MEDICINE

## 2024-03-14 PROCEDURE — 250N000011 HC RX IP 250 OP 636: Performed by: EMERGENCY MEDICINE

## 2024-03-14 PROCEDURE — 83735 ASSAY OF MAGNESIUM: CPT | Performed by: EMERGENCY MEDICINE

## 2024-03-14 PROCEDURE — 96375 TX/PRO/DX INJ NEW DRUG ADDON: CPT | Mod: 59

## 2024-03-14 PROCEDURE — 99285 EMERGENCY DEPT VISIT HI MDM: CPT | Mod: 25

## 2024-03-14 PROCEDURE — 71045 X-RAY EXAM CHEST 1 VIEW: CPT

## 2024-03-14 PROCEDURE — 80053 COMPREHEN METABOLIC PANEL: CPT | Performed by: EMERGENCY MEDICINE

## 2024-03-14 PROCEDURE — 85025 COMPLETE CBC W/AUTO DIFF WBC: CPT | Performed by: EMERGENCY MEDICINE

## 2024-03-14 PROCEDURE — 96374 THER/PROPH/DIAG INJ IV PUSH: CPT | Mod: 59

## 2024-03-14 PROCEDURE — 85730 THROMBOPLASTIN TIME PARTIAL: CPT | Performed by: EMERGENCY MEDICINE

## 2024-03-14 RX ORDER — IOPAMIDOL 755 MG/ML
100 INJECTION, SOLUTION INTRAVASCULAR ONCE
Status: COMPLETED | OUTPATIENT
Start: 2024-03-14 | End: 2024-03-14

## 2024-03-14 RX ORDER — DILTIAZEM HYDROCHLORIDE 120 MG/1
120 TABLET, FILM COATED ORAL DAILY
Qty: 30 TABLET | Refills: 0 | Status: SHIPPED | OUTPATIENT
Start: 2024-03-14 | End: 2024-03-22

## 2024-03-14 RX ORDER — DILTIAZEM HYDROCHLORIDE 5 MG/ML
15 INJECTION INTRAVENOUS ONCE
Status: COMPLETED | OUTPATIENT
Start: 2024-03-14 | End: 2024-03-14

## 2024-03-14 RX ORDER — KETOROLAC TROMETHAMINE 15 MG/ML
15 INJECTION, SOLUTION INTRAMUSCULAR; INTRAVENOUS ONCE
Status: COMPLETED | OUTPATIENT
Start: 2024-03-14 | End: 2024-03-14

## 2024-03-14 RX ADMIN — KETOROLAC TROMETHAMINE 15 MG: 15 INJECTION, SOLUTION INTRAMUSCULAR; INTRAVENOUS at 19:47

## 2024-03-14 RX ADMIN — RIVAROXABAN 20 MG: 10 TABLET, FILM COATED ORAL at 22:21

## 2024-03-14 RX ADMIN — IOPAMIDOL 100 ML: 755 INJECTION, SOLUTION INTRAVENOUS at 20:40

## 2024-03-14 RX ADMIN — DILTIAZEM HYDROCHLORIDE 15 MG: 5 INJECTION INTRAVENOUS at 19:47

## 2024-03-14 ASSESSMENT — ENCOUNTER SYMPTOMS
FEVER: 0
COUGH: 0
CHILLS: 0
SHORTNESS OF BREATH: 1

## 2024-03-14 ASSESSMENT — ACTIVITIES OF DAILY LIVING (ADL)
ADLS_ACUITY_SCORE: 35

## 2024-03-14 ASSESSMENT — COLUMBIA-SUICIDE SEVERITY RATING SCALE - C-SSRS
2. HAVE YOU ACTUALLY HAD ANY THOUGHTS OF KILLING YOURSELF IN THE PAST MONTH?: NO
1. IN THE PAST MONTH, HAVE YOU WISHED YOU WERE DEAD OR WISHED YOU COULD GO TO SLEEP AND NOT WAKE UP?: NO
6. HAVE YOU EVER DONE ANYTHING, STARTED TO DO ANYTHING, OR PREPARED TO DO ANYTHING TO END YOUR LIFE?: NO

## 2024-03-14 NOTE — ED TRIAGE NOTES
"Pt arrives to triage ambulatory escorted to wheelchair w/ wife endorsing fatigue onset yesterday evening, today went to work continued fatigue and noticed pain in the left underarm into left side of body this has been going on a few days and worsening today 4/10.  As of yesterday, \"Feel like something's stuck in my throat\" referring to swollen throat \"lump\" feeling. Dx w/ pneumonia last Wednesday taking antibiotics.        "

## 2024-03-14 NOTE — ED PROVIDER NOTES
EMERGENCY DEPARTMENT ENCOUNTER      NAME: Cayetano Palmer  AGE: 53 year old male  YOB: 1970  MRN: [id number]  EVALUATION DATE & TIME: 3/14/2024  6:36 PM    PCP: No Ref-Primary, Physician    ED PROVIDER: Kennedi Hairston DO      Chief Complaint   Patient presents with    Chest Pain         FINAL IMPRESSION:  1. New onset atrial fibrillation (H)    2. Atypical chest pain          ED COURSE & MEDICAL DECISION MAKIN-year-old male presented to the ED for evaluation of left-sided chest pain as well as feelings of fatigue and a lump in his throat.  Of note, the patient was evaluated here in the ED and diagnosed with pneumonia approximate 1 week ago.  Upon arrival to the ED the patient was hypertensive.  He was otherwise hemodynamically stable.  The patient did not appear to be in any obvious distress or discomfort at the time of his initial evaluation.  The patient was noted to have tachycardia with an irregular rhythm.  The remainder of his physical exam was unremarkable.    EKG reveals atrial fibrillation with rapid ventricular response.  The patient was given a dose of IV diltiazem for rate control.  He was also given a dose of Toradol to treat his left-sided chest pain.    CBC, BMP, and magnesium were all reassuring other than the elevated glucose level.  The patient did not have evidence of DKA.  The patient's troponin was 49 initially.  Repeat troponin 2 hours later was 51.      Chest x-ray shows a mildly enlarged heart.  CT scan of the chest shows findings consistent with pulmonary pretension.  There were no blood clots or consolidations concerning for pneumonia.    The patient was reevaluated and informed of the lab and imaging results.  At the time reevaluation the patient's heart rate was in the 90s.  Patient has no previous history of atrial fibrillation.  He was informed that his chest discomfort other symptoms may related to the atrial fibrillation.  The patient was given a dose of Xarelto  while here in the ED.  The patient was then sent home with a prescription for Xarelto and diltiazem.  A referral to the rapid access cardiology clinic was also placed.  The patient was instructed to return back to ED sooner for any worsening palpitations, chest pain or pressure, shortness breath, dizziness, or any other new or concerning symptoms.    Pertinent Labs & Imaging studies reviewed. (See chart for details)  7:27 PM I met with the patient to gather history and to perform my initial exam. We discussed plans for the ED course, including diagnostic testing and treatment.   10:10 PM Discussed discharge with the patient.       At the conclusion of the encounter I discussed the results of all of the tests and the disposition. The questions were answered. The patient or family acknowledged understanding and was agreeable with the care plan.     Medical Decision Making    History:  Supplemental history from: Documented in chart  External Record(s) reviewed: Documented in chart    Work Up:  Chart documentation includes differential considered and any EKGs or imaging independently interpreted by provider, where specified.  In additional to work up documented, I considered the following work up: Documented in chart, if applicable.    External consultation:  Discussion of management with another provider: Documented in chart, if applicable    Complicating factors:  Care impacted by chronic illness: Heart Disease, Hyperlipidemia, Hypertension, and Smoking / Nicotine Use  Care affected by social determinants of health: N/A    Disposition considerations: Discharge. I prescribed additional prescription strength medication(s) as charted. See documentation for any additional details.        PPE worn: n95 mask, goggles    MEDICATIONS GIVEN IN THE EMERGENCY:  Medications   ketorolac (TORADOL) injection 15 mg (15 mg Intravenous $Given 3/14/24 1947)   diltiazem (CARDIZEM) injection 15 mg (15 mg Intravenous $Given 3/14/24 1947)    iopamidol (ISOVUE-370) solution 100 mL (100 mLs Intravenous $Given 3/14/24 2040)   rivaroxaban ANTICOAGULANT (XARELTO) tablet 20 mg (20 mg Oral $Given 3/14/24 2221)       NEW PRESCRIPTIONS STARTED AT TODAY'S ER VISIT  Discharge Medication List as of 3/14/2024 10:22 PM        START taking these medications    Details   diltiazem (CARDIZEM) 120 MG tablet Take 1 tablet (120 mg) by mouth daily, Disp-30 tablet, R-0, E-Prescribe      rivaroxaban ANTICOAGULANT (XARELTO) 20 MG TABS tablet Take 1 tablet (20 mg) by mouth daily (with dinner), Disp-30 tablet, R-0, E-Prescribe                =================================================================    HPI    Patient information was obtained from: the patient    Use of : N/A       Cayetano Palmer is a 53 year old male with a pertinent history of HTN, grade II diastolic dysfunction, CAD, HLD, NSTEMI, and tobacco use, who presents to this ED by private vehicle with  for evaluation of chest pain.    Per chart review, the patient was seen on 3/5/2024 for evaluation of cough. During this visit, the patient had a CXR with results as follows: Diffusely increased interstitial markings throughout the lungs, which could reflect interstitial edema and/or atypical infection. No focal airspace opacities. No pleural effusions or pneumothorax. The patient was started on doxycycline with Augmentin. Patient discharged with instruction to follow up with PCP.     For the past 2-3 days, the patient has had intermittent left sided chest discomfort with associated shortness of breath. This pain is worse with exertion, and does not go away when at rest. He has been on antibiotics for ~8 days with one day left in the course. Patient has not had worsening fever, chills, or cough since being on the medication. No prior history of DVT/PE. The patient denies pain or swelling of the lower extremities. No other complaints at this time.        REVIEW OF SYSTEMS   Review of Systems  "  Constitutional:  Negative for chills and fever.   Respiratory:  Positive for shortness of breath. Negative for cough.    Cardiovascular:  Positive for chest pain (left sided). Negative for leg swelling.   Musculoskeletal:         Negative for leg pain   All other systems reviewed and are negative.       PAST MEDICAL HISTORY:  History reviewed. No pertinent past medical history.    PAST SURGICAL HISTORY:  History reviewed. No pertinent surgical history.        CURRENT MEDICATIONS:    diltiazem (CARDIZEM) 120 MG tablet  rivaroxaban ANTICOAGULANT (XARELTO) 20 MG TABS tablet  amoxicillin-clavulanate (AUGMENTIN) 875-125 MG tablet  ASPIRIN LOW DOSE 81 MG chewable tablet  atorvastatin (LIPITOR) 80 MG tablet  cephALEXin (KEFLEX) 500 MG capsule  doxycycline hyclate (VIBRAMYCIN) 100 MG capsule  EPINEPHrine (ANY BX GENERIC EQUIV) 0.3 MG/0.3ML injection 2-pack  hydrochlorothiazide (HYDRODIURIL) 25 MG tablet  losartan (COZAAR) 100 MG tablet  metFORMIN (GLUCOPHAGE) 500 MG tablet  rosuvastatin (CRESTOR) 20 MG tablet        ALLERGIES:  Allergies   Allergen Reactions    Metoprolol Itching     Itching started <1h after taking this med 2x       FAMILY HISTORY:  History reviewed. No pertinent family history.    SOCIAL HISTORY:   Social History     Socioeconomic History    Marital status:      Spouse name: None    Number of children: None    Years of education: None    Highest education level: None   Tobacco Use    Smoking status: Every Day     Types: Cigarettes    Smokeless tobacco: Never       VITALS:  BP (!) 120/96   Pulse 91   Temp 97.8  F (36.6  C) (Oral)   Resp 21   Ht 1.727 m (5' 8\")   Wt 134.3 kg (296 lb)   SpO2 98%   BMI 45.01 kg/m      PHYSICAL EXAM    General presentation: Alert, Vital signs reviewed. NAD  HENT: ENT inspection is normal. Oropharynx is moist and clear.   Eye: Pupils are equal and reactive to light. EOMI  Neck: The neck is supple, with full ROM, with no evidence of meningismus.  Pulmonary: " Currently in no acute respiratory distress. Normal, non labored respirations, the lung sounds are normal with good equal air movement. Clear to auscultation bilaterally.   Circulatory: Tachycardic. Irregularly irregular. Peripheral pulses are strong and equal. No murmurs, rubs, or gallops.   Abdominal: The abdomen is soft. Nontender. No rigidity, guarding, or rebound. Bowel sounds normal.   Neurologic: Alert, oriented to person, place, and time. No motor deficit. No sensory deficit. Cranial nerves II through XII are intact.  Musculoskeletal: No extremity tenderness. Full range of motion in all extremities. No extremity edema.   Skin: Skin color is normal. No rash. Warm. Dry to touch.      LAB:  All pertinent labs reviewed and interpreted.  Results for orders placed or performed during the hospital encounter of 03/14/24   XR Chest Port 1 View    Impression    IMPRESSION: Mildly enlarged heart. Mildly tortuous aorta. No pneumothorax or pleural effusion. No focal consolidation. No acute osseous abnormality.   CT Chest Pulmonary Embolism w Contrast    Impression    IMPRESSION:  1.  No PE, dissection or aneurysm.    2.  Enlargement of the central pulmonary artery most typical for changes of central pulmonary arterial hypertension.    3.  Mosaic perfusion in both lungs which is nonspecific, most typical for air trapping commonly associated with small airway inflammation.    4.  Slightly prominent right precarinal lymph node which measures 1.8 cm in greatest dimension, this may be reactive in nature.   Extra Blue Top Tube   Result Value Ref Range    Hold Specimen JIC    Extra Red Top Tube   Result Value Ref Range    Hold Specimen JIC    Extra Green Top (Lithium Heparin) Tube   Result Value Ref Range    Hold Specimen JIC    Extra Purple Top Tube   Result Value Ref Range    Hold Specimen JIC    Result Value Ref Range    INR 1.10 0.85 - 1.15   Partial thromboplastin time   Result Value Ref Range    aPTT 31 22 - 38 Seconds    Comprehensive metabolic panel   Result Value Ref Range    Sodium 140 135 - 145 mmol/L    Potassium 4.0 3.4 - 5.3 mmol/L    Carbon Dioxide (CO2) 26 22 - 29 mmol/L    Anion Gap 12 7 - 15 mmol/L    Urea Nitrogen 18.0 6.0 - 20.0 mg/dL    Creatinine 0.91 0.67 - 1.17 mg/dL    GFR Estimate >90 >60 mL/min/1.73m2    Calcium 9.4 8.6 - 10.0 mg/dL    Chloride 102 98 - 107 mmol/L    Glucose 313 (H) 70 - 99 mg/dL    Alkaline Phosphatase 75 40 - 150 U/L    AST 37 0 - 45 U/L    ALT 43 0 - 70 U/L    Protein Total 7.2 6.4 - 8.3 g/dL    Albumin 3.8 3.5 - 5.2 g/dL    Bilirubin Total 0.4 <=1.2 mg/dL   Result Value Ref Range    Magnesium 1.9 1.7 - 2.3 mg/dL   Result Value Ref Range    Troponin T, High Sensitivity 49 (H) <=22 ng/L   CBC with platelets and differential   Result Value Ref Range    WBC Count 11.1 (H) 4.0 - 11.0 10e3/uL    RBC Count 5.24 4.40 - 5.90 10e6/uL    Hemoglobin 16.3 13.3 - 17.7 g/dL    Hematocrit 46.9 40.0 - 53.0 %    MCV 90 78 - 100 fL    MCH 31.1 26.5 - 33.0 pg    MCHC 34.8 31.5 - 36.5 g/dL    RDW 12.3 10.0 - 15.0 %    Platelet Count 217 150 - 450 10e3/uL    % Neutrophils 61 %    % Lymphocytes 27 %    % Monocytes 8 %    % Eosinophils 3 %    % Basophils 1 %    % Immature Granulocytes 0 %    NRBCs per 100 WBC 0 <1 /100    Absolute Neutrophils 6.8 1.6 - 8.3 10e3/uL    Absolute Lymphocytes 3.0 0.8 - 5.3 10e3/uL    Absolute Monocytes 0.9 0.0 - 1.3 10e3/uL    Absolute Eosinophils 0.3 0.0 - 0.7 10e3/uL    Absolute Basophils 0.1 0.0 - 0.2 10e3/uL    Absolute Immature Granulocytes 0.0 <=0.4 10e3/uL    Absolute NRBCs 0.0 10e3/uL   Troponin T, High Sensitivity (now)   Result Value Ref Range    Troponin T, High Sensitivity 51 (H) <=22 ng/L       RADIOLOGY:  Reviewed all pertinent imaging. Please see official radiology report.  CT Chest Pulmonary Embolism w Contrast   Final Result   IMPRESSION:   1.  No PE, dissection or aneurysm.      2.  Enlargement of the central pulmonary artery most typical for changes of central  pulmonary arterial hypertension.      3.  Mosaic perfusion in both lungs which is nonspecific, most typical for air trapping commonly associated with small airway inflammation.      4.  Slightly prominent right precarinal lymph node which measures 1.8 cm in greatest dimension, this may be reactive in nature.      XR Chest Port 1 View   Final Result   IMPRESSION: Mildly enlarged heart. Mildly tortuous aorta. No pneumothorax or pleural effusion. No focal consolidation. No acute osseous abnormality.          EKG:    Atrial fibrillation.  Rate of 117.  Normal QRS.  No QT.  Nonspecific ST segment changes noted.  Compared to the EKG on 3/5/2024 atrial fibrillation with rapid ventricular response has replaced normal sinus rhythm.  No other significant changes are noted.    I have independently reviewed and interpreted the EKG(s) documented above.      I, Kelly Samuels , am serving as a scribe to document services personally performed by Kennedi Hairston DO based on my observation and the provider's statements to me. I, Kennedi Hairston, attest that Kelly Samuels is acting in a scribe capacity, has observed my performance of the services and has documented them in accordance with my direction.    Kennedi Hairston DO  Emergency Medicine  [facility] EMERGENCY DEPARTMENT  [address] [place] [id number]  [phone]       Kennedi Hairston DO  03/14/24 3014

## 2024-03-15 NOTE — DISCHARGE INSTRUCTIONS
You were diagnosed with atrial fibrillation here tonight to department.  This is likely causing your symptoms including the chest discomfort.        Take the prescribed diltiazem to help control your heart rate.  Take the Xarelto to prevent any blood clots.      A referral was placed for you to follow-up with cardiology.  You will likely be contacted within next few days for the appointment.  Please return back to ED sooner for any worsening chest pain or pressure, palpitations, shortness breath, dizziness, or any other new or concerning symptoms.

## 2024-03-15 NOTE — ED NOTES
Pts call light on. Pt needing to use the bathroom.  Pt feeling comfortable to walk to bathroom.

## 2024-03-18 ENCOUNTER — HOSPITAL ENCOUNTER (EMERGENCY)
Facility: HOSPITAL | Age: 54
Discharge: HOME | End: 2024-03-18
Attending: EMERGENCY MEDICINE | Admitting: EMERGENCY MEDICINE
Payer: COMMERCIAL

## 2024-03-18 VITALS
HEIGHT: 69 IN | TEMPERATURE: 97.9 F | OXYGEN SATURATION: 99 % | DIASTOLIC BLOOD PRESSURE: 98 MMHG | HEART RATE: 83 BPM | WEIGHT: 296 LBS | BODY MASS INDEX: 43.84 KG/M2 | SYSTOLIC BLOOD PRESSURE: 151 MMHG | RESPIRATION RATE: 18 BRPM

## 2024-03-18 DIAGNOSIS — K21.9 GASTROESOPHAGEAL REFLUX DISEASE, UNSPECIFIED WHETHER ESOPHAGITIS PRESENT: ICD-10-CM

## 2024-03-18 DIAGNOSIS — L50.9 URTICARIA: ICD-10-CM

## 2024-03-18 PROBLEM — E87.6 HYPOKALEMIA: Status: ACTIVE | Noted: 2021-01-04

## 2024-03-18 PROBLEM — L50.8 RECURRENT URTICARIA: Status: ACTIVE | Noted: 2021-01-05

## 2024-03-18 PROBLEM — I25.10 MILD CAD: Status: ACTIVE | Noted: 2021-01-05

## 2024-03-18 PROBLEM — R73.03 PREDIABETES: Status: ACTIVE | Noted: 2021-01-05

## 2024-03-18 PROBLEM — R73.9 HYPERGLYCEMIA: Status: ACTIVE | Noted: 2021-01-04

## 2024-03-18 PROBLEM — G56.00 CARPAL TUNNEL SYNDROME: Chronic | Status: ACTIVE | Noted: 2020-01-21

## 2024-03-18 PROBLEM — I51.89 GRADE II DIASTOLIC DYSFUNCTION: Status: ACTIVE | Noted: 2021-01-05

## 2024-03-18 PROBLEM — F17.200 SMOKER: Chronic | Status: ACTIVE | Noted: 2021-01-05

## 2024-03-18 PROBLEM — E78.2 MIXED HYPERLIPIDEMIA: Status: ACTIVE | Noted: 2021-01-05

## 2024-03-18 PROBLEM — I21.4 NSTEMI (NON-ST ELEVATED MYOCARDIAL INFARCTION) (H): Status: ACTIVE | Noted: 2024-03-18

## 2024-03-18 PROBLEM — G47.33 OSA (OBSTRUCTIVE SLEEP APNEA): Status: ACTIVE | Noted: 2017-11-17

## 2024-03-18 PROBLEM — Z87.898 H/O ANGIOEDEMA: Status: ACTIVE | Noted: 2021-01-05

## 2024-03-18 PROBLEM — M19.011 PRIMARY OSTEOARTHRITIS OF RIGHT SHOULDER: Chronic | Status: ACTIVE | Noted: 2020-01-21

## 2024-03-18 PROBLEM — I10 ESSENTIAL HYPERTENSION: Status: ACTIVE | Noted: 2018-10-24

## 2024-03-18 PROCEDURE — 99283 EMERGENCY DEPT VISIT LOW MDM: CPT

## 2024-03-18 RX ORDER — FAMOTIDINE 20 MG/1
20 TABLET, FILM COATED ORAL 2 TIMES DAILY
Qty: 40 TABLET | Refills: 0 | Status: SHIPPED | OUTPATIENT
Start: 2024-03-18 | End: 2024-03-26

## 2024-03-18 ASSESSMENT — COLUMBIA-SUICIDE SEVERITY RATING SCALE - C-SSRS
1. IN THE PAST MONTH, HAVE YOU WISHED YOU WERE DEAD OR WISHED YOU COULD GO TO SLEEP AND NOT WAKE UP?: NO
6. HAVE YOU EVER DONE ANYTHING, STARTED TO DO ANYTHING, OR PREPARED TO DO ANYTHING TO END YOUR LIFE?: NO
2. HAVE YOU ACTUALLY HAD ANY THOUGHTS OF KILLING YOURSELF IN THE PAST MONTH?: NO

## 2024-03-18 NOTE — DISCHARGE INSTRUCTIONS
PATIENT CALLED AND STATES THE MEDICATION   esomeprazole (nexIUM) 40 MG capsule    WAS CALLED IN TO THE WRONG PHARMACY. IT WAS SENT TO [facility]    IT NEEDS TO GO TO   [facility] - [place], [place] - [address] AT [address] [facility] - [phone]  - [phone]   [phone]    AS STATED IN THE NOTE  HE HAS A WEEK LEFT      CALL BACK NUMBER [phone]   You were seen in the emergency department for concern about a rash.  We do think your symptoms seem consistent with urticaria which is a skin eruption sometimes caused by environmental allergies, other times as a reaction to medication.  As we discussed, we would recommend continuing Benadryl 25 to 50 mg every 6 hours as needed for this.  We are glad things seem to be improving this morning.  We are not sure if this is directly related to the medications that you started a few days ago.  For now since your heart rate seems to be well-controlled, we would recommend continuing this with plan to see cardiology later this week to see how you are doing.  If you have any severe worsening rash, breathing difficulties, vomiting, lightheadedness, etc. we should reevaluate in the emergency department right away.    We also discussed your concern about acid reflux.  We are going to recommend Pepcid 20 mg twice a day, this is sometimes the medication we also use for allergic reactions in the gut.  Please continue to stick to a very bland diet avoiding spicy foods fatty foods and alcohol.  Please review this with your primary at your earliest convenience especially if not getting better with medications over the next week.

## 2024-03-18 NOTE — ED PROVIDER NOTES
EMERGENCY DEPARTMENT ENCOUNTER      NAME: Cayetano Palmer  AGE: 53 year old male  YOB: 1970  MRN: [id number]  EVALUATION DATE & TIME: 3/18/2024  7:05 AM    PCP: No Ref-Primary, Physician    ED PROVIDER: Issac Abel M.D.      Chief Complaint   Patient presents with    Allergic Reaction    Rash         FINAL IMPRESSION:  1. Urticaria    2. Gastroesophageal reflux disease, unspecified whether esophagitis present          ED COURSE & MEDICAL DECISION MAKIN:14 AM I met with the patient to gather history and to perform my initial exam. I discussed the plan for care while in the Emergency Department. We discussed the plan for discharge and the patient is agreeable. Reviewed supportive cares, symptomatic treatment, outpatient follow up, and reasons to return to the Emergency Department. All questions and concerns were addressed. Patient to be discharged by ED RN.     53 year old male presents to the Emergency Department for evaluation of rash.  Patient seen in the emergency department a few days ago and diagnosed with new onset atrial fibrillation.  He presents concerned about a rash on his upper extremities that occurred while he was driving to work today.  He says he has had this in the past not infrequently, attributes it to environmental allergies, usually gets better with Benadryl.  He took 25 mg of Benadryl prior to arrival and says that things seem to be improving at this time.  Is a trace urticarial rash across his upper extremities, nothing else notable.  He also has secondary concerns about ongoing acid reflux since his visit a few days ago.  At that visit he did have a nonischemic EKG and negative troponin, I do not think this is any kind of atypical ACS.  No signs of anaphylaxis on his exam today.  Symptoms seem to be rapidly improving after taking Benadryl at home.  His rate is now well-controlled on diltiazem, pulse still feels irregular.  He has follow-up with cardiology coming up in  a few days.  We discussed having him continue antihistamines like Benadryl and could add Pepcid twice daily both for allergy and reflux symptoms.  Right now, but unclear if this is a reaction to the current medications which she has been tolerating otherwise for the last few days.  For now we will have him continue the diltiazem and Eliquis, if having persistent rash or other reaction, might need to consider transitioning to a beta-blocker or alternate therapy.  Patient was in agreement with this.  He was discharged in stable condition.    At the conclusion of the encounter I discussed the results of all of the tests and the disposition. The questions were answered. The patient or family acknowledged understanding and was agreeable with the care plan.       Medical Decision Making  Obtained supplemental history:Supplemental history obtained?: No  Reviewed external records: External records reviewed?: Documented in chart and Inpatient Record: From 3/14/2024 ED visit  Care impacted by chronic illness:Heart Disease, Hyperlipidemia, Hypertension, and Smoking / Nicotine Use  Care significantly affected by social determinants of health:N/A  Did you consider but not order tests?: Work up considered but not performed and documented in chart, if applicable  Did you interpret images independently?: Independent interpretation of ECG and images noted in documentation, when applicable.  Consultation discussion with other provider:Did you involve another provider (consultant, MH, pharmacy, etc.)?: No  Discharge. I prescribed additional prescription strength medication(s) as charted. See documentation for any additional details.        MEDICATIONS GIVEN IN THE EMERGENCY:  Medications - No data to display    NEW PRESCRIPTIONS STARTED AT TODAY'S ER VISIT  Discharge Medication List as of 3/18/2024  7:32 AM        START taking these medications    Details   famotidine (PEPCID) 20 MG tablet Take 1 tablet (20 mg) by mouth 2 times daily,  "Disp-40 tablet, R-0, Local Print                =================================================================    HPI    Patient information was obtained from: patient     Use of : N/A        Cayetano JENNIFER Palmer is a 53 year old male with a pertinent history of atrial fibrillation, hypertension, hyperlipidemia, grade II diastolic dysfunction, CAD, NSTEMI, and tobacco use who presents to this ED private car for evaluation of rash, heartburn.     Patient endorses intermittent episodes of rash to bilateral arms and face with additional facial swelling. He notes the episode today started while he was driving to work. The rash is itchy and red. In prior episodes, patient was able to take a benadryl prior to symptoms worsening. Today, he states he took a benadryl but it was \"too late\" in his symptom progression. Since taking it, he does note improvement in symptoms.     Patient also endorses heartburn since 3/13/24 which prompted his visit on 3/14/24 at [facility]. He reports burning with swallowing and has been on an all liquid diet the past 3 days. Denies history of similar symptoms. He has been taking pepto bismol with no alleviation in symptoms. Patient has an appointment with cardiology on 3/22/24.     Per chart review, patient was seen on 3/14/24 at [facility] for new onset atrial fibrillation. Of note, the patient was evaluated here in the ED and diagnosed with pneumonia approximate 1 week ago. EKG reveals atrial fibrillation with rapid ventricular response. The patient was given a dose of IV diltiazem for rate control. He was also given a dose of Toradol to treat his left-sided chest pain. CBC, BMP, and magnesium were all reassuring other than the elevated glucose level.  The patient did not have evidence of DKA.  The patient's troponin was 49 initially.  Repeat troponin 2 hours later was 51. Chest x-ray shows a mildly enlarged heart. CT scan of the chest shows findings consistent with pulmonary pretension. There " "were no blood clots or consolidations concerning for pneumonia. The patient was given a dose of Xarelto while here in the ED. The patient was then sent home with a prescription for Xarelto and diltiazem. Patient discharged home.       REVIEW OF SYSTEMS   All systems reviewed and negative except as noted in HPI.    PAST MEDICAL HISTORY:  No past medical history on file.    PAST SURGICAL HISTORY:  No past surgical history on file.        CURRENT MEDICATIONS:    No current facility-administered medications for this encounter.     Current Outpatient Medications   Medication    famotidine (PEPCID) 20 MG tablet    ASPIRIN LOW DOSE 81 MG chewable tablet    atorvastatin (LIPITOR) 80 MG tablet    cephALEXin (KEFLEX) 500 MG capsule    diltiazem (CARDIZEM) 120 MG tablet    EPINEPHrine (ANY BX GENERIC EQUIV) 0.3 MG/0.3ML injection 2-pack    hydrochlorothiazide (HYDRODIURIL) 25 MG tablet    losartan (COZAAR) 100 MG tablet    metFORMIN (GLUCOPHAGE) 500 MG tablet    rivaroxaban ANTICOAGULANT (XARELTO) 20 MG TABS tablet    rosuvastatin (CRESTOR) 20 MG tablet         ALLERGIES:  Allergies   Allergen Reactions    Metoprolol Itching     Itching started <1h after taking this med 2x       FAMILY HISTORY:  No family history on file.    SOCIAL HISTORY:   Social History     Socioeconomic History    Marital status:    Tobacco Use    Smoking status: Every Day     Types: Cigarettes    Smokeless tobacco: Never       VITALS:  BP (!) 151/98   Pulse 83   Temp 97.9  F (36.6  C) (Oral)   Resp 18   Ht 1.753 m (5' 9\")   Wt 134.3 kg (296 lb)   SpO2 99%   BMI 43.71 kg/m      PHYSICAL EXAM    Constitutional: Well developed, Well nourished, NAD.  HENT: Normocephalic, Atraumatic. Neck Supple.  Eyes: EOMI, Conjunctiva normal.  Respiratory: Breathing comfortably on room air. Speaks full sentences easily. Lungs clear to ascultation.  Cardiovascular: Normal heart rate, Irregular rhythm. No peripheral edema.  Abdomen: Soft, " nontender  Musculoskeletal: Good range of motion in all major joints. No major deformities noted.  Integument: Warm, Dry.  Neurologic: Alert & awake, Normal motor function, Normal sensory function, No focal deficits noted.   Psychiatric: Cooperative. Affect appropriate.         I, Isaiah Carvalho , am serving as a scribe to document services personally performed by Dr. Issac Abel, based on my observation and the provider's statements to me. I, Issac Abel MD attest that Isaiah Carvalho  is acting in a scribe capacity, has observed my performance of the services and has documented them in accordance with my direction.    Issac Abel M.D.  Emergency Medicine  [facility] EMERGENCY DEPARTMENT  [address] [place] [id number] [phone]  Dept: [phone]       Issac Abel MD  03/18/24 0803

## 2024-03-18 NOTE — ED TRIAGE NOTES
Cayetano presents to triage ambulatory with conerns of allergic reaction and rash. States rash started this morning about 30 minutes ago. Took 25mg of benadryl at 0645. Started taking diltiazem and rivaroxaban 3/15. Denies SOB. Rash to face, chest, and arms in triage. Sweating in triage.     Triage Assessment (Adult)       Row Name 03/18/24 0701          Triage Assessment    Airway WDL WDL        Respiratory WDL    Respiratory WDL WDL        Skin Circulation/Temperature WDL    Skin Circulation/Temperature WDL X  rash        Cardiac WDL    Cardiac WDL WDL        Peripheral/Neurovascular WDL    Peripheral Neurovascular WDL WDL        Cognitive/Neuro/Behavioral WDL    Cognitive/Neuro/Behavioral WDL WDL

## 2024-03-20 LAB
ATRIAL RATE - MUSE: 90 BPM
DIASTOLIC BLOOD PRESSURE - MUSE: NORMAL MMHG
INTERPRETATION ECG - MUSE: NORMAL
P AXIS - MUSE: 33 DEGREES
PR INTERVAL - MUSE: 182 MS
QRS DURATION - MUSE: 108 MS
QT - MUSE: 398 MS
QTC - MUSE: 486 MS
R AXIS - MUSE: -72 DEGREES
SYSTOLIC BLOOD PRESSURE - MUSE: NORMAL MMHG
T AXIS - MUSE: 91 DEGREES
VENTRICULAR RATE- MUSE: 90 BPM

## 2024-03-22 ENCOUNTER — OFFICE VISIT (OUTPATIENT)
Dept: CARDIOLOGY | Facility: CLINIC | Age: 54
End: 2024-03-22
Attending: EMERGENCY MEDICINE
Payer: COMMERCIAL

## 2024-03-22 VITALS
DIASTOLIC BLOOD PRESSURE: 64 MMHG | RESPIRATION RATE: 18 BRPM | WEIGHT: 294 LBS | HEART RATE: 55 BPM | SYSTOLIC BLOOD PRESSURE: 124 MMHG | HEIGHT: 69 IN | BODY MASS INDEX: 43.55 KG/M2

## 2024-03-22 DIAGNOSIS — I21.4 NSTEMI (NON-ST ELEVATED MYOCARDIAL INFARCTION) (H): Primary | ICD-10-CM

## 2024-03-22 DIAGNOSIS — E11.9 TYPE 2 DIABETES MELLITUS WITHOUT COMPLICATION, WITHOUT LONG-TERM CURRENT USE OF INSULIN (H): ICD-10-CM

## 2024-03-22 DIAGNOSIS — I48.91 NEW ONSET ATRIAL FIBRILLATION (H): ICD-10-CM

## 2024-03-22 PROCEDURE — 99204 OFFICE O/P NEW MOD 45 MIN: CPT | Performed by: INTERNAL MEDICINE

## 2024-03-22 RX ORDER — HYDROCHLOROTHIAZIDE 25 MG/1
25 TABLET ORAL DAILY
Qty: 20 TABLET | Refills: 3 | Status: SHIPPED | OUTPATIENT
Start: 2024-03-22 | End: 2024-04-22

## 2024-03-22 RX ORDER — DILTIAZEM HYDROCHLORIDE 240 MG/1
240 CAPSULE, EXTENDED RELEASE ORAL DAILY
Qty: 30 CAPSULE | Refills: 2 | Status: SHIPPED | OUTPATIENT
Start: 2024-03-22 | End: 2024-04-22

## 2024-03-22 RX ORDER — LOSARTAN POTASSIUM 100 MG/1
100 TABLET ORAL DAILY
Qty: 20 TABLET | Refills: 3 | Status: SHIPPED | OUTPATIENT
Start: 2024-03-22 | End: 2024-04-22

## 2024-03-22 RX ORDER — ROSUVASTATIN CALCIUM 20 MG/1
20 TABLET, COATED ORAL DAILY
Qty: 20 TABLET | Refills: 3 | Status: SHIPPED | OUTPATIENT
Start: 2024-03-22 | End: 2024-04-22

## 2024-03-22 NOTE — PROGRESS NOTES
Thank you, Dr. Hairston, for asking [facility] to evaluate Mr. Cayetano Palmer.      Assessment/Recommendations   Assessment:    Paroxysmal atrial fibrillation, newly documented, mildly elevated ventricular response rate  Coronary artery disease with known 60% RCA stenosis, no classical angina  Hypertension, controlled  Obstructive sleep apnea on CPAP  Diabetes mellitus, newly documented  Hypercholesterolemia on statin    Plan:  Continue Xarelto.  He can stop taking aspirin    Increase diltiazem to 240 mg a days to improve rate control    Echo and stress test    We discussed long-term management of atrial fibrillation.  We talked about antiarrhythmics and ablation.  He is interested in pursuing ablation.  He would like to have it done at the same time that he is undergoing cardioversion.  I will discuss it with EP physician           History of Present Illness    Mr. Cayetano Palmer is a 53 year old male who comes in for initial cardiac evaluation.  1 week ago he went to the emergency room with shortness of breath and cough.  He thought he had he had recurrence of pneumonia.  He was found to be in atrial fibrillation with mildly elevated ventricular response rate.  The patient was unaware of heart rhythm change.  He denies syncope or near syncope.  He has no history of documented atrial fibrillation.  He does report that he feels on occasions weak and lightheaded.  He did not connect that with any type of heart rhythm abnormalities in the past.  His cardiac history significant for coronary artery disease.  He had a coronary angiogram at [facility] of 2021.  He had mild to moderate diffuse coronary artery disease with 60% RCA stenosis.  FFR was normal and no intervention was carried out.  He does not get typical angina pain.  He has had preserved LV systolic function.    He is known to have sleep apnea and he uses CPAP machine regularly.    ECG: Personally reviewed.  A-fib with mildly elevated  "ventricular response rate.    Echocardiogram: January 2021 at [facility]  1.  Normal left ventricular chamber size and systolic function. Calculated left ventricular   ejection fraction is 63 %. No regional wall    motion abnormalities.    Mildly increased left ventricular wall thickness.    2.  Grade 2 left ventricular diastolic dysfunction consistent with moderately increased left   ventricular filling pressure.    3.  Normal right ventricular size and systolic function.    4.  Mild mitral valve regurgitation.         Coronary Angiogram: January 2021 at [facility]  * Right dominant coronary artery system and large caliber vessels with mild coronary calcification.   * The left main artery has 20% eccentric stenosis in the mid portion.   * The LAD has minimal disease. Diagonals are moderate caliber with minimal disease.   * The circumflex artery has minimal disease proximally. OM1 is smaller with 20% ostial stenosis. OM2 is large and normal.   * The RCA is very large with 60% proximal stenosis and 30% mid stenosis. The remainder of the vessel has minimal disease. Baseline FFR measured in the RCA was 1.0 and hyperemic (IV adenosine infusion) FFR was 0.93 indicating the disease is not hemodynamically significant.      Physical Examination Review of Systems   /64 (BP Location: Left arm, Patient Position: Sitting, Cuff Size: Adult Large)   Pulse 55   Resp 18   Ht 1.74 m (5' 8.5\")   Wt 133.4 kg (294 lb)   BMI 44.05 kg/m    Body mass index is 44.05 kg/m .  Wt Readings from Last 3 Encounters:   03/22/24 133.4 kg (294 lb)   03/18/24 134.3 kg (296 lb)   03/14/24 134.3 kg (296 lb)     General Appearance:   Alert, cooperative, no distress, appears stated age   Head/ENT: Normocephalic, without obvious abnormality. Membranes moist      EYES:  no scleral icterus, normal conjunctivae   Neck: Supple, symmetrical, trachea midline, no adenopathy, thyroid: not enlarged, symmetric, no carotid bruit or JVD   Chest/Lungs:   Lungs are " "clear to auscultation, respirations unlabored. No tenderness or deformity    Cardiovascular:   irregular rhythm, S1, S2 normal, no murmur, rub or gallop.   Abdomen:  Soft, non-tender, bowel sounds active all four quadrants,  no masses, no organomegaly   Extremities: no cyanosis or clubbing. No edema   Skin: Skin color, texture, turgor normal, no rashes or lesions.    Psychiatric: Normal affect, calm   Neurologic: Alert and oriented x 3, moving all four extremities.     Enc Vitals  BP: 124/64  Pulse: 55  Resp: 18  Weight: 133.4 kg (294 lb)  Height: 174 cm (5' 8.5\")                                          Lab Results    Chemistry/lipid CBC Cardiac Enzymes/BNP/TSH/INR   No results for input(s): \"CHOL\", \"HDL\", \"LDL\", \"TRIG\", \"CHOLHDLRATIO\" in the last 16053 hours.  No results for input(s): \"LDL\" in the last 09866 hours.  Recent Labs   Lab Test 03/14/24  1711      POTASSIUM 4.0   CHLORIDE 102   CO2 26   *   BUN 18.0   CR 0.91   GFRESTIMATED >90   SHEELA 9.4     Recent Labs   Lab Test 03/14/24  1711 03/05/24  1813   CR 0.91 0.86     Recent Labs   Lab Test 03/05/24  1813   A1C 9.9*          Recent Labs   Lab Test 03/14/24  1711   WBC 11.1*   HGB 16.3   HCT 46.9   MCV 90        Recent Labs   Lab Test 03/14/24  1711 03/05/24  1813   HGB 16.3 16.1    No results for input(s): \"TROPONINI\" in the last 43291 hours.  Recent Labs   Lab Test 03/05/24  1813   NTBNPI 903*     No results for input(s): \"TSH\" in the last 23389 hours.  Recent Labs   Lab Test 03/14/24  1711   INR 1.10        Medical History  Surgical History Family History Social History   Pneumonia  Sleep apnea Left lung decortication No premature CAD, SCD,cardiomyopathy   Social History     Socioeconomic History    Marital status:      Spouse name: Not on file    Number of children: Not on file    Years of education: Not on file    Highest education level: Not on file   Occupational History    Not on file   Tobacco Use    Smoking status: Former "     Types: Cigarettes     Quit date: 3/1/2024     Years since quittin.0    Smokeless tobacco: Never   Substance and Sexual Activity    Alcohol use: Not on file    Drug use: Not on file    Sexual activity: Not on file   Other Topics Concern    Not on file   Social History Narrative    Not on file     Social Determinants of Health     Financial Resource Strain: Not on file   Food Insecurity: Not on file   Transportation Needs: Not on file   Physical Activity: Not on file   Stress: Not on file   Social Connections: Not on file   Interpersonal Safety: Not on file   Housing Stability: Not on file         Medications  Allergies   Current Outpatient Medications   Medication Sig Dispense Refill    diltiazem ER (DILT-XR) 240 MG 24 hr ER beaded capsule Take 1 capsule (240 mg) by mouth daily 30 capsule 2    famotidine (PEPCID) 20 MG tablet Take 1 tablet (20 mg) by mouth 2 times daily 40 tablet 0    hydrochlorothiazide (HYDRODIURIL) 25 MG tablet Take 1 tablet (25 mg) by mouth daily 20 tablet 3    losartan (COZAAR) 100 MG tablet Take 1 tablet (100 mg) by mouth daily 20 tablet 3    metFORMIN (GLUCOPHAGE) 500 MG tablet Take 1 tablet (500 mg) by mouth 2 times daily (with meals) 40 tablet 3    rivaroxaban ANTICOAGULANT (XARELTO) 20 MG TABS tablet Take 1 tablet (20 mg) by mouth daily (with dinner) 30 tablet 3    rosuvastatin (CRESTOR) 20 MG tablet Take 1 tablet (20 mg) by mouth daily 20 tablet 3    cephALEXin (KEFLEX) 500 MG capsule Take 1 capsule (500 mg) by mouth 2 times daily (Patient not taking: Reported on 3/5/2024) 14 capsule 0    EPINEPHrine (ANY BX GENERIC EQUIV) 0.3 MG/0.3ML injection 2-pack Inject 0.3 mg into the muscle (Patient not taking: Reported on 3/5/2024)          Allergies   Allergen Reactions    Metoprolol Itching     Itching started <1h after taking this med 2x

## 2024-03-22 NOTE — PATIENT INSTRUCTIONS
Cayetano Palmer,    It was a pleasure to see you today at the [facility].     My recommendations after this visit include:    Aspirin and atorvastatin if you are still taking it  Continue taking the rosuvastatin and Xarelto  Increase diltiazem to 240 mg a day  Stress test and echo    Will discuss with electrophysiologist the sequence of cardioversion and ablation    MILADYS Hannon MD, FACC, [place]ELY[place]

## 2024-03-22 NOTE — LETTER
3/22/2024    Angélica Dc PA-C  [address]  [facility] [address]    RE: Cayetano Palmer       Dear Colleague,     I had the pleasure of seeing Cayetano Palmer in the [facility].        Thank you, Dr. Hairston, for asking [facility] to evaluate Mr. Cayetano Palmer.      Assessment/Recommendations   Assessment:    Paroxysmal atrial fibrillation, newly documented, mildly elevated ventricular response rate  Coronary artery disease with known 60% RCA stenosis, no classical angina  Hypertension, controlled  Obstructive sleep apnea on CPAP  Diabetes mellitus, newly documented  Hypercholesterolemia on statin    Plan:  Continue Xarelto.  He can stop taking aspirin    Increase diltiazem to 240 mg a days to improve rate control    Echo and stress test    We discussed long-term management of atrial fibrillation.  We talked about antiarrhythmics and ablation.  He is interested in pursuing ablation.  He would like to have it done at the same time that he is undergoing cardioversion.  I will discuss it with EP physician           History of Present Illness    Mr. Cayetano Palmer is a 53 year old male who comes in for initial cardiac evaluation.  1 week ago he went to the emergency room with shortness of breath and cough.  He thought he had he had recurrence of pneumonia.  He was found to be in atrial fibrillation with mildly elevated ventricular response rate.  The patient was unaware of heart rhythm change.  He denies syncope or near syncope.  He has no history of documented atrial fibrillation.  He does report that he feels on occasions weak and lightheaded.  He did not connect that with any type of heart rhythm abnormalities in the past.  His cardiac history significant for coronary artery disease.  He had a coronary angiogram at [facility] of 2021.  He had mild to moderate diffuse coronary artery disease with 60% RCA stenosis.  FFR was normal and no intervention was carried  "out.  He does not get typical angina pain.  He has had preserved LV systolic function.    He is known to have sleep apnea and he uses CPAP machine regularly.    ECG: Personally reviewed.  A-fib with mildly elevated ventricular response rate.    Echocardiogram: January 2021 at [facility]  1.  Normal left ventricular chamber size and systolic function. Calculated left ventricular   ejection fraction is 63 %. No regional wall    motion abnormalities.    Mildly increased left ventricular wall thickness.    2.  Grade 2 left ventricular diastolic dysfunction consistent with moderately increased left   ventricular filling pressure.    3.  Normal right ventricular size and systolic function.    4.  Mild mitral valve regurgitation.         Coronary Angiogram: January 2021 at [facility]  * Right dominant coronary artery system and large caliber vessels with mild coronary calcification.   * The left main artery has 20% eccentric stenosis in the mid portion.   * The LAD has minimal disease. Diagonals are moderate caliber with minimal disease.   * The circumflex artery has minimal disease proximally. OM1 is smaller with 20% ostial stenosis. OM2 is large and normal.   * The RCA is very large with 60% proximal stenosis and 30% mid stenosis. The remainder of the vessel has minimal disease. Baseline FFR measured in the RCA was 1.0 and hyperemic (IV adenosine infusion) FFR was 0.93 indicating the disease is not hemodynamically significant.      Physical Examination Review of Systems   /64 (BP Location: Left arm, Patient Position: Sitting, Cuff Size: Adult Large)   Pulse 55   Resp 18   Ht 1.74 m (5' 8.5\")   Wt 133.4 kg (294 lb)   BMI 44.05 kg/m    Body mass index is 44.05 kg/m .  Wt Readings from Last 3 Encounters:   03/22/24 133.4 kg (294 lb)   03/18/24 134.3 kg (296 lb)   03/14/24 134.3 kg (296 lb)     General Appearance:   Alert, cooperative, no distress, appears stated age   Head/ENT: Normocephalic, without obvious abnormality. " "Membranes moist      EYES:  no scleral icterus, normal conjunctivae   Neck: Supple, symmetrical, trachea midline, no adenopathy, thyroid: not enlarged, symmetric, no carotid bruit or JVD   Chest/Lungs:   Lungs are clear to auscultation, respirations unlabored. No tenderness or deformity    Cardiovascular:   irregular rhythm, S1, S2 normal, no murmur, rub or gallop.   Abdomen:  Soft, non-tender, bowel sounds active all four quadrants,  no masses, no organomegaly   Extremities: no cyanosis or clubbing. No edema   Skin: Skin color, texture, turgor normal, no rashes or lesions.    Psychiatric: Normal affect, calm   Neurologic: Alert and oriented x 3, moving all four extremities.     Enc Vitals  BP: 124/64  Pulse: 55  Resp: 18  Weight: 133.4 kg (294 lb)  Height: 174 cm (5' 8.5\")                                          Lab Results    Chemistry/lipid CBC Cardiac Enzymes/BNP/TSH/INR   No results for input(s): \"CHOL\", \"HDL\", \"LDL\", \"TRIG\", \"CHOLHDLRATIO\" in the last 79861 hours.  No results for input(s): \"LDL\" in the last 88386 hours.  Recent Labs   Lab Test 03/14/24  1711      POTASSIUM 4.0   CHLORIDE 102   CO2 26   *   BUN 18.0   CR 0.91   GFRESTIMATED >90   SHEELA 9.4     Recent Labs   Lab Test 03/14/24  1711 03/05/24  1813   CR 0.91 0.86     Recent Labs   Lab Test 03/05/24  1813   A1C 9.9*          Recent Labs   Lab Test 03/14/24  1711   WBC 11.1*   HGB 16.3   HCT 46.9   MCV 90        Recent Labs   Lab Test 03/14/24  1711 03/05/24  1813   HGB 16.3 16.1    No results for input(s): \"TROPONINI\" in the last 54490 hours.  Recent Labs   Lab Test 03/05/24  1813   NTBNPI 903*     No results for input(s): \"TSH\" in the last 25463 hours.  Recent Labs   Lab Test 03/14/24  1711   INR 1.10        Medical History  Surgical History Family History Social History   Pneumonia  Sleep apnea Left lung decortication No premature CAD, SCD,cardiomyopathy   Social History     Socioeconomic History    Marital status:      " Spouse name: Not on file    Number of children: Not on file    Years of education: Not on file    Highest education level: Not on file   Occupational History    Not on file   Tobacco Use    Smoking status: Former     Types: Cigarettes     Quit date: 3/1/2024     Years since quittin.0    Smokeless tobacco: Never   Substance and Sexual Activity    Alcohol use: Not on file    Drug use: Not on file    Sexual activity: Not on file   Other Topics Concern    Not on file   Social History Narrative    Not on file     Social Determinants of Health     Financial Resource Strain: Not on file   Food Insecurity: Not on file   Transportation Needs: Not on file   Physical Activity: Not on file   Stress: Not on file   Social Connections: Not on file   Interpersonal Safety: Not on file   Housing Stability: Not on file         Medications  Allergies   Current Outpatient Medications   Medication Sig Dispense Refill    diltiazem ER (DILT-XR) 240 MG 24 hr ER beaded capsule Take 1 capsule (240 mg) by mouth daily 30 capsule 2    famotidine (PEPCID) 20 MG tablet Take 1 tablet (20 mg) by mouth 2 times daily 40 tablet 0    hydrochlorothiazide (HYDRODIURIL) 25 MG tablet Take 1 tablet (25 mg) by mouth daily 20 tablet 3    losartan (COZAAR) 100 MG tablet Take 1 tablet (100 mg) by mouth daily 20 tablet 3    metFORMIN (GLUCOPHAGE) 500 MG tablet Take 1 tablet (500 mg) by mouth 2 times daily (with meals) 40 tablet 3    rivaroxaban ANTICOAGULANT (XARELTO) 20 MG TABS tablet Take 1 tablet (20 mg) by mouth daily (with dinner) 30 tablet 3    rosuvastatin (CRESTOR) 20 MG tablet Take 1 tablet (20 mg) by mouth daily 20 tablet 3    cephALEXin (KEFLEX) 500 MG capsule Take 1 capsule (500 mg) by mouth 2 times daily (Patient not taking: Reported on 3/5/2024) 14 capsule 0    EPINEPHrine (ANY BX GENERIC EQUIV) 0.3 MG/0.3ML injection 2-pack Inject 0.3 mg into the muscle (Patient not taking: Reported on 3/5/2024)          Allergies   Allergen Reactions     Metoprolol Itching     Itching started <1h after taking this med 2x                        Thank you for allowing me to participate in the care of your patient.      Sincerely,     Rinku Hannon MD     [facility]  cc:   Kennedi Hairston DO  [address] [place] [address]

## 2024-03-26 ENCOUNTER — OFFICE VISIT (OUTPATIENT)
Dept: FAMILY MEDICINE | Facility: CLINIC | Age: 54
End: 2024-03-26
Payer: COMMERCIAL

## 2024-03-26 VITALS
TEMPERATURE: 98.3 F | HEIGHT: 69 IN | SYSTOLIC BLOOD PRESSURE: 127 MMHG | RESPIRATION RATE: 12 BRPM | DIASTOLIC BLOOD PRESSURE: 73 MMHG | WEIGHT: 291.2 LBS | OXYGEN SATURATION: 100 % | HEART RATE: 63 BPM | BODY MASS INDEX: 43.13 KG/M2

## 2024-03-26 DIAGNOSIS — J15.9 COMMUNITY ACQUIRED BACTERIAL PNEUMONIA: Primary | ICD-10-CM

## 2024-03-26 DIAGNOSIS — Z12.11 SCREEN FOR COLON CANCER: ICD-10-CM

## 2024-03-26 DIAGNOSIS — E11.65 TYPE 2 DIABETES MELLITUS WITH HYPERGLYCEMIA, WITHOUT LONG-TERM CURRENT USE OF INSULIN (H): ICD-10-CM

## 2024-03-26 DIAGNOSIS — L50.8 RECURRENT URTICARIA: ICD-10-CM

## 2024-03-26 DIAGNOSIS — I10 ESSENTIAL HYPERTENSION: ICD-10-CM

## 2024-03-26 DIAGNOSIS — G47.33 OSA (OBSTRUCTIVE SLEEP APNEA): ICD-10-CM

## 2024-03-26 DIAGNOSIS — K21.9 GASTROESOPHAGEAL REFLUX DISEASE, UNSPECIFIED WHETHER ESOPHAGITIS PRESENT: ICD-10-CM

## 2024-03-26 DIAGNOSIS — I48.91 ATRIAL FIBRILLATION, UNSPECIFIED TYPE (H): ICD-10-CM

## 2024-03-26 DIAGNOSIS — I25.10 MILD CAD: ICD-10-CM

## 2024-03-26 PROBLEM — E11.9 DIABETES MELLITUS, TYPE 2 (H): Status: RESOLVED | Noted: 2024-03-22 | Resolved: 2024-03-26

## 2024-03-26 PROCEDURE — 99204 OFFICE O/P NEW MOD 45 MIN: CPT | Performed by: PHYSICIAN ASSISTANT

## 2024-03-26 NOTE — PROGRESS NOTES
Assessment & Plan     Community acquired bacterial pneumonia  New issue. Was seen in ER on 3/5/24 for cough and breathing difficulty  Finished course of Augmentin and Doxycycline.  No SOB. Intermittent dry cough continues  If symptoms worsen or do not improve, please follow up     Type 2 diabetes mellitus with hyperglycemia, without long-term current use of insulin (H)  BMI 45.0-49.9, adult (H)  New issue. Incidentally found on ER visit on 3/5/24. HgA1C was 9.9 at that time. Was started in Metformin  Discussed exercise and healthful diet choices  Referral placed to Diabetic Educator and Nutritionist for further lifestyle and health recommendations.  Plan to follow up in 3 months for lab check  Will need to discuss eye exam, vaccines, labs and tobacco cessation at upcoming visit.  - PRIMARY CARE FOLLOW-UP SCHEDULING  - Adult Diabetes Education  Referral  - Nutrition Referral    Mild CAD  Atrial fibrillation, unspecified type (H)  New issue  Spontaneously found to be in A.Fib at ER visit on 3/14/24.  Cardiology following. Started on diltiazem and Xarelto  Next visit with cardiology is on 4/4/24. Will get ECHO and stress test at that visit.      Recurrent urticaria  Chronic issue. Has been happening for years.   Has been tested for allergies and there has not been an identifiable trigger to his reaction.  Benadryl helps to subside the reaction  No residual symptoms since ER visit on 3/18/24  Plan to follow up if symptoms return    Gastroesophageal reflux disease, unspecified whether esophagitis present  New issue  Was started on Pepcid at ER visit on 3/18/24  Symptoms have significantly subsided since starting this medication  Continue this mediation as needed for symptom control     Essential hypertension  Chronic issue.  BP WNL today  Continue current medications for ongoing BP control    Screen for colon cancer  Has never had colon cancer screening. Overdue  Referral placed today    - Colonoscopy  "Screening  Referral    RADHA (obstructive sleep apnea)  Chronic issue  Has not been seen by Sleep Medicine in a while to ensure setting are appropriate.  Needs his mask refitted  Sleep Medicine referral placed for follow up on RADHA    - Adult Sleep Eval & Management  Referral      MED REC REQUIRED  Post Medication Reconciliation Status:     BMI  Estimated body mass index is 43 kg/m  as calculated from the following:    Height as of this encounter: 1.753 m (5' 9\").    Weight as of this encounter: 132.1 kg (291 lb 3.2 oz).   Weight management plan: Discussed healthy diet and exercise guidelines and referral to Diabetes  for further information on Nutrition and diabetic control. Also referral to Nutritionist was placed.      CONSULTATION/REFERRAL to Sleep Medicine, Colon Cancer Screening, Nutrition, Diabetes Educator   Work on weight loss  Regular exercise    Subjective   Cayetano is a 53 year old, presenting for the following health issues:  Hospital F/U (Hospital follow up.  Breathing is getting back to normal, but not there quite yet. Would like to discuss medications. )        3/26/2024    12:28 PM   Additional Questions   Roomed by CELENA Sarkar CMA([facility])   Accompanied by Spouse Nubia     [facility]       Hospital Follow-up Visit:    Hospital/Nursing Home/IP Rehab Facility: [facility]  Date of Admission: 03/05/2024  Date of Discharge: 03/18/2024  Reason(s) for Admission: HTN, A Fib, Allergy, DM    Was your hospitalization related to COVID-19? No   Problems taking medications regularly:  None  Medication changes since discharge: Diltiazem was increased  Problems adhering to non-medication therapy:  None    Summary of hospitalization:  [facility] discharge summary reviewed  Diagnostic Tests/Treatments reviewed.  Follow up needed: none  Other Healthcare Providers Involved in Patient s Care:         None  Update since discharge: improved. "         Plan of care communicated with patient           Cayetano Palmer is a 53 year old male, new to this provider, who presents to follow up on three different ER visits in the last month.    Denies on going SOB and CP. Still has an intermittent cough but it has improved since admission on 3/5/24. Cough is dry in nature.    Heartburn has resolved since taking Pepcid.     Met with Cardiology 4 day ago. Was in A.Fib at that time. Has an appointment scheduled for April 4th; will do a Stress test and ECHO at that visit. Cardiology increased diltiazem dose to improve rate control.     Allergic reaction: Has resolved. This has been happening for years. Has done scratch tests. Unable to identify environmental trigger. He can feel when the reaction is coming on. Taking benadryl helps the rash subside. Has not had rash since ER visit on 3/18/24.    Has been feeling more tired and sleeping more than usual over the last 2 weeks. He does use a CPAP. Has not seen sleep medicine in quite some time to ensure settings are stable. Recently switching healthcare systems and would like referral to Sleep Medicine for RADHA follow up and to ensure CPAP settings are appropriate.     Has been working on diet and exercise. Quit drinking soda and minimizing sugar intake.       Review of Systems  CONSTITUTIONAL: NEGATIVE for fever, chills, change in weight  INTEGUMENTARY/SKIN: NEGATIVE for worrisome rashes, moles or lesions  EYES: NEGATIVE for vision changes or irritation  ENT/MOUTH: NEGATIVE for ear, mouth and throat problems  RESP: NEGATIVE for SOB, POSITIVE for intermittent dry cough (improved from previously)  CV: NEGATIVE for chest pain, palpitations or peripheral edema  GI: NEGATIVE for nausea, abdominal pain, or change in bowel habits, POSITIVE for heartburn (has improved since taking Pepcid)  : NEGATIVE for frequency, dysuria, or hematuria  MUSCULOSKELETAL: NEGATIVE for significant arthralgias or myalgia  NEURO: NEGATIVE for weakness,  "dizziness or paresthesias,POSITIVE for lightheadedness (just a couple times since ER visits. No syncope. Does not drinks a lot of water throughout the day. Recommend increasing water intake)  ENDOCRINE: NEGATIVE for temperature intolerance, skin/hair changes  HEME: POSITIVE for bleeding problems. (Has noticed decreased clotting time since being on Xarelto)  PSYCHIATRIC: NEGATIVE for changes in mood or affect      Objective    /73   Pulse 63   Temp 98.3  F (36.8  C) (Oral)   Resp 12   Ht 1.753 m (5' 9\")   Wt 132.1 kg (291 lb 3.2 oz)   SpO2 100%   BMI 43.00 kg/m    Body mass index is 43 kg/m .  Physical Exam   GENERAL: alert and no distress  NECK: no adenopathy, no asymmetry, masses, or scars  RESP: lungs clear to auscultation - no rales, rhonchi or wheezes  CV: regular rate and rhythm, normal S1 S2, no S3 or S4, no murmur, click or rub, no peripheral edema  MS: no gross musculoskeletal defects noted, no edema  SKIN: no suspicious lesions or rashes  LYMPH: no cervical, supraclavicular, axillary, or inguinal adenopathy    No results found for any visits on 03/26/24.        Signed Electronically by: Angélica Dc PA-C    "

## 2024-04-04 ENCOUNTER — HOSPITAL ENCOUNTER (OUTPATIENT)
Dept: CARDIOLOGY | Facility: HOSPITAL | Age: 54
Discharge: HOME | End: 2024-04-04
Attending: INTERNAL MEDICINE
Payer: COMMERCIAL

## 2024-04-04 ENCOUNTER — HOSPITAL ENCOUNTER (OUTPATIENT)
Dept: NUCLEAR MEDICINE | Facility: HOSPITAL | Age: 54
Discharge: HOME | End: 2024-04-04
Attending: INTERNAL MEDICINE
Payer: COMMERCIAL

## 2024-04-04 DIAGNOSIS — I48.91 NEW ONSET ATRIAL FIBRILLATION (H): Primary | ICD-10-CM

## 2024-04-04 DIAGNOSIS — I21.4 NSTEMI (NON-ST ELEVATED MYOCARDIAL INFARCTION) (H): ICD-10-CM

## 2024-04-04 LAB
CV STRESS MAX HR HE: 128
NUC STRESS EJECTION FRACTION: 42 %
RATE PRESSURE PRODUCT: NORMAL
STRESS ECHO BASELINE DIASTOLIC HE: 95
STRESS ECHO BASELINE HR: 88 BPM
STRESS ECHO BASELINE SYSTOLIC BP: 136
STRESS ECHO CALCULATED PERCENT HR: 76 %
STRESS ECHO LAST STRESS DIASTOLIC BP: 96
STRESS ECHO LAST STRESS SYSTOLIC BP: 124
STRESS ECHO TARGET HR: 168

## 2024-04-04 PROCEDURE — A9500 TC99M SESTAMIBI: HCPCS | Performed by: INTERNAL MEDICINE

## 2024-04-04 PROCEDURE — 255N000002 HC RX 255 OP 636: Performed by: INTERNAL MEDICINE

## 2024-04-04 PROCEDURE — 93018 CV STRESS TEST I&R ONLY: CPT | Performed by: INTERNAL MEDICINE

## 2024-04-04 PROCEDURE — 78452 HT MUSCLE IMAGE SPECT MULT: CPT

## 2024-04-04 PROCEDURE — 93306 TTE W/DOPPLER COMPLETE: CPT | Mod: 26 | Performed by: STUDENT IN AN ORGANIZED HEALTH CARE EDUCATION/TRAINING PROGRAM

## 2024-04-04 PROCEDURE — 93017 CV STRESS TEST TRACING ONLY: CPT

## 2024-04-04 PROCEDURE — 250N000011 HC RX IP 250 OP 636: Performed by: INTERNAL MEDICINE

## 2024-04-04 PROCEDURE — 93016 CV STRESS TEST SUPVJ ONLY: CPT | Performed by: STUDENT IN AN ORGANIZED HEALTH CARE EDUCATION/TRAINING PROGRAM

## 2024-04-04 PROCEDURE — 999N000208 ECHOCARDIOGRAM COMPLETE

## 2024-04-04 PROCEDURE — 343N000001 HC RX 343: Performed by: INTERNAL MEDICINE

## 2024-04-04 PROCEDURE — 78452 HT MUSCLE IMAGE SPECT MULT: CPT | Mod: 26 | Performed by: INTERNAL MEDICINE

## 2024-04-04 RX ORDER — REGADENOSON 0.08 MG/ML
0.4 INJECTION, SOLUTION INTRAVENOUS ONCE
Status: COMPLETED | OUTPATIENT
Start: 2024-04-04 | End: 2024-04-04

## 2024-04-04 RX ORDER — AMINOPHYLLINE 25 MG/ML
50 INJECTION, SOLUTION INTRAVENOUS
Status: DISCONTINUED | OUTPATIENT
Start: 2024-04-04 | End: 2024-04-04 | Stop reason: HOSPADM

## 2024-04-04 RX ADMIN — Medication 10.2 MILLICURIE: at 09:30

## 2024-04-04 RX ADMIN — Medication 41.5 MILLICURIE: at 11:21

## 2024-04-04 RX ADMIN — REGADENOSON 0.4 MG: 0.08 INJECTION, SOLUTION INTRAVENOUS at 10:13

## 2024-04-04 RX ADMIN — PERFLUTREN 2 ML: 6.52 INJECTION, SUSPENSION INTRAVENOUS at 09:00

## 2024-04-04 NOTE — PROGRESS NOTES
Obed Hannon MD  4/4/2024 12:54 PM CDT Back to Top      Borderline LV function otherwise no significant abnormalities awaiting stress test results.  Refer to EP A-fib clinic       Referral placed. -[facility]

## 2024-04-22 ENCOUNTER — PREP FOR PROCEDURE (OUTPATIENT)
Dept: CARDIOLOGY | Facility: CLINIC | Age: 54
End: 2024-04-22

## 2024-04-22 ENCOUNTER — DOCUMENTATION ONLY (OUTPATIENT)
Dept: CARDIOLOGY | Facility: CLINIC | Age: 54
End: 2024-04-22

## 2024-04-22 ENCOUNTER — OFFICE VISIT (OUTPATIENT)
Dept: CARDIOLOGY | Facility: CLINIC | Age: 54
End: 2024-04-22
Attending: INTERNAL MEDICINE
Payer: COMMERCIAL

## 2024-04-22 VITALS
BODY MASS INDEX: 43.69 KG/M2 | DIASTOLIC BLOOD PRESSURE: 70 MMHG | HEIGHT: 69 IN | WEIGHT: 295 LBS | SYSTOLIC BLOOD PRESSURE: 120 MMHG | HEART RATE: 60 BPM | RESPIRATION RATE: 16 BRPM

## 2024-04-22 DIAGNOSIS — I21.4 NSTEMI (NON-ST ELEVATED MYOCARDIAL INFARCTION) (H): ICD-10-CM

## 2024-04-22 DIAGNOSIS — I48.0 PAROXYSMAL ATRIAL FIBRILLATION (H): Primary | ICD-10-CM

## 2024-04-22 DIAGNOSIS — I48.91 NEW ONSET ATRIAL FIBRILLATION (H): ICD-10-CM

## 2024-04-22 PROCEDURE — 99205 OFFICE O/P NEW HI 60 MIN: CPT | Performed by: INTERNAL MEDICINE

## 2024-04-22 PROCEDURE — G2211 COMPLEX E/M VISIT ADD ON: HCPCS | Performed by: INTERNAL MEDICINE

## 2024-04-22 RX ORDER — LOSARTAN POTASSIUM 100 MG/1
100 TABLET ORAL DAILY
Qty: 90 TABLET | Refills: 3 | Status: SHIPPED | OUTPATIENT
Start: 2024-04-22

## 2024-04-22 RX ORDER — ROSUVASTATIN CALCIUM 20 MG/1
20 TABLET, COATED ORAL DAILY
Qty: 90 TABLET | Refills: 3 | Status: SHIPPED | OUTPATIENT
Start: 2024-04-22

## 2024-04-22 RX ORDER — LIDOCAINE 40 MG/G
CREAM TOPICAL
Status: CANCELLED | OUTPATIENT
Start: 2024-04-22

## 2024-04-22 RX ORDER — HYDROCHLOROTHIAZIDE 25 MG/1
25 TABLET ORAL DAILY
Qty: 90 TABLET | Refills: 3 | Status: SHIPPED | OUTPATIENT
Start: 2024-04-22

## 2024-04-22 RX ORDER — FENTANYL CITRATE 50 UG/ML
25 INJECTION, SOLUTION INTRAMUSCULAR; INTRAVENOUS
Status: CANCELLED | OUTPATIENT
Start: 2024-05-23

## 2024-04-22 RX ORDER — SODIUM CHLORIDE 9 MG/ML
100 INJECTION, SOLUTION INTRAVENOUS CONTINUOUS
Status: CANCELLED | OUTPATIENT
Start: 2024-05-23

## 2024-04-22 RX ORDER — DILTIAZEM HYDROCHLORIDE 240 MG/1
240 CAPSULE, EXTENDED RELEASE ORAL DAILY
Qty: 90 CAPSULE | Refills: 2 | Status: SHIPPED | OUTPATIENT
Start: 2024-04-22 | End: 2024-09-30

## 2024-04-22 NOTE — PATIENT INSTRUCTIONS
[facility]  Cardiac Electrophysiology  [address]  [place] [address]   Office: [phone]  Fax: [phone]       Thank you for seeing us in clinic today - it is a pleasure to be a part of your care team.  Below is a summary of our plan from today's visit.       You have persistent atrial fibrillation, presently being managed with Diltiazem  We reviewed physiology and management options including antiarrhythmic drug therapy, catheter ablation and lifestyle modification.  We will plan for the following:  - we will plan an ablation  - continue Diltiazem for now  - continue Xarelto     Please do not hesitate to be in touch with our office at [phone] with any questions that may arise.       Thank you for trusting us with your care,    Vesta Jones MD  Clinical Cardiac Electrophysiology  [facility]  [address]  [place] [address]   Office: [phone]  Fax: [phone]                 ATRIAL FIBRILLATION: Patient Information     What is atrial fibrillation?  Atrial fibrillation (AF, A-fib) is a common heart rhythm problem (arrhythmia) occurring within the upper chambers of the heart (the atria).  In normal rhythm, the upper and lower chambers of the heart are electrically driven to contract in a coordinated sequence.  In atrial fibrillation, the atria lose their ability to contract because of rapid and chaotic electrical activity.  The lower chambers of the heart (the ventricles) continue to pump blood throughout the body, though with irregular and often faster rate due to the chaotic activity within the atria.          How do I know if I have atrial fibrillation?   Some people may feel their heart beating faster, harder, or irregularly while in atrial fibrillation.  Others may be lightheaded, fatigued, feel weak or tired or become more short of breath especially with activities.  Some patients have no symptoms at all.   Atrial fibrillation may be found due to an irregular pulse or on an electrocardiogram (ECG). Atrial fibrillation can start and stop on its own, and episodes can last from seconds to several months.       How common is atrial fibrillation?   An estimated 3-6 million people in the [place] have atrial fibrillation.  Atrial fibrillation is a common heart rhythm problem for older persons, affecting as estimated 12-15% of people over the age of 65 years of age.     What causes atrial fibrillation?   Age is the most important risk factor for atrial fibrillation.  Atrial fibrillation is more common in people with other heart disease, high blood pressure, diabetes, obesity, sleep apnea and in people who regularly consume alcohol.  Surgery, lung disease, or thyroid problems can lead to atrial fibrillation.  Atrial fibrillation has multiple possible causes, and in most cases a single cause cannot be found.  Atrial fibrillation is a progressive condition, usually starting with at an early stage with short and infrequent episodes.  In later stages of disease, more frequent and longer lasting episodes of atrial fibrillation occur, ultimately culminating in episodes which do not spontaneously terminate.  Generally, more enlargement and scarring within the upper chambers of the heart is observed as atrial fibrillation progresses from early to late-stage disease.     How is atrial fibrillation diagnosed and evaluated?    Because of its start-stop nature, atrial fibrillation can be challenging to diagnose.  Atrial fibrillation is most commonly diagnosed via cardiac rhythm recordings - either an ECG or wearable cardiac rhythm monitor.  For patients with pacemakers, defibrillators or implantable loop recorders, atrial fibrillation may be recorded via these devices.  Recently, commercially available devices (eg. Apple Watch, NewBay device, certain FitBit devices, others) can allow patients to take 30 second cardiac rhythm  recordings which may document atrial fibrillation.  Once atrial fibrillation is diagnosed, additional tests include blood tests and an echocardiogram.  The echocardiogram uses ultrasound to look at your heart to assess your cardiac function and evaluate for other heart disease.  Additional evaluation may include CT or MRI studies.     Is atrial fibrillation dangerous?   Atrial fibrillation is not usually a life-threatening arrhythmia.  The most serious consequences of atrial fibrillation including stroke and worsening of overall cardiac function.  While in atrial fibrillation, the upper cardiac chambers do not contract normally, resulting in slower blood flow and increased risk of clot formation.  If this blood clot becomes detached from the heart a stroke can occur.  Unfortunately, stroke can be the first sign of atrial fibrillation for some people.  With a stroke, you may notice abnormal sensation, weakness on one side of the body or face, changes in your vision or speech.  If you have any of these signs, you should contact EMS and be evaluated in an emergency room as soon as possible.       How is atrial fibrillation treated?     Several treatment options exist for suppressing atrial fibrillation - however, it is not an easily curable arrhythmia.  The first goal in managing atrial fibrillation is to minimize stroke risk.  The second goal is to improve symptoms associated with atrial fibrillation.  Finally, in patients with reduced cardiac function, maintaining normal rhythm can help improve cardiac function.       Blood thinners are used to reduce the risk of stroke in patients with high estimated stroke risk related to atrial fibrillation.  For patients at higher risk of bleeding related to blood thinner use, implantable devices may be an option to reduce stroke risk without the need for long term blood thinner use.       Atrial fibrillation can be managed via two strategies: rate control and rhythm control.  In  a rate control strategy, continued atrial fibrillation is accepted and medications (eg. beta-blockers or calcium channel blockers) are used to control the lower chamber rate.  In a rhythm control strategy, anti-arrhythmic medications or catheter ablation are used to maintain normal cardiac rhythm and slow disease progression by suppressing atrial fibrillation.  A procedure called a cardioversion, in which an electric shock is delivered through patches placed on the chest wall while under deep sedation, can be performed to temporarily restore normal cardiac rhythm, though does not address the chance of atrial fibrillation recurrence.  Treatments are more effective for earlier rather than later stage atrial fibrillation.  Lifestyle modifications (maintaining a healthy weight, aerobic exercise, diagnosing and treating sleep apnea, and minimizing alcohol intake) are important elements of atrial fibrillation rhythm control.      What is catheter ablation for atrial fibrillation?  Cardiac catheter ablation is a commonly performed, minimally invasive procedure performed by a cardiac electrophysiologist to treat many different cardiac rhythm abnormalities.  During catheter ablation, long, thin, flexible tubes are advanced into the heart via small sheaths inserted into the femoral veins and thermal energy (either heating or cooling) is applied within the heart to disrupt abnormal electrical activity.  Atrial fibrillation ablation is performed under general anesthesia, with procedures generally taking approximately 2-3 hours.  Patients are typically observed for 3-5 hours after the ablation, and in most cases can be discharged home the same day.  Atrial fibrillation ablation is associated with better outcomes (mortality, cardiovascular hospitalizations, atrial arrhythmia recurrences) compared to antiarrhythmic drug therapy.  However, atrial fibrillation recurrences are not uncommon, and repeat catheter ablation may be  offered.  Your electrophysiology team can review atrial fibrillation ablation, anticipated success rates, risks, and recovery expectations with you.     What are anti-arrhythmic medications?  Anti-arrhythmic medications are specialized drugs which alter cardiac electrical functioning to suppress arrhythmias.  There are several anti-arrhythmic medications available, each with its own success rate and side effects.  Some anti-arrhythmic medications are less effective though safer to use, others are more effective though have serious potential toxicities.  Atrial fibrillation recurrences are common and may require dose adjustment or change in antiarrhythmic therapy.  Your electrophysiology team will carefully consider which medication would be the best and safest for your particular case.       Can I live a normal life?    The goal of atrial fibrillation management is for patients to live normal lives without being limited by symptoms related to atrial fibrillation.     Are any additional educational resources available?  There are a number of excellent atrial fibrillation education resources available to you online.  A few options you may wish to review include:  hrsonline.org/guide-atrial-fibrillation  afibmatters.org  getsmartaboutafib.com  stopaf.com     What comes next?    Consider your management options and let us know how we can help in your decision process.  Please take medications as they have been prescribed.  You should also get any tests that may have been ordered for you.       When to Call Your Doctor or seek emergency care:  Call your doctor or seek emergency care if you have any significant changes with the following:  Weakness  Dizziness  Fainting  Fatigue  Shortness of breath  Chest pain with increased activity  If you are concerned that your heart rate is too fast or too slow  Bleeding that does not stop in 10 minutes  Coughing or throwing up blood  Bloody diarrhea or bleeding  hemorrhoids  Dark-colored urine or black stool  Allergic reactions:  Rash  Itching  Swelling  Trouble breathing or swallowing        Please call the [facility] at [phone] if you have concerns about your symptoms, your medicines, or your follow-up appointments.

## 2024-04-22 NOTE — PROGRESS NOTES
H&P:  Card OV  Date:  EP YARITZA [] PVI  Order Case Req Y  Order Set Y Lab/EKG   Orders [] Imaging Order None     AC Xarelto-CONTINUE   AAD Diltiazem -Hold 3 days prior   PPI/H2 Blocker Start Protonix 40mg Daily 3 days prior, 6wk post   Diuretics HCTZ   DM/GLP-1 DM Meds-  Metformin  GLP-1- None   General Anesthesia   CARTO mapping system   Hold Diltiazem 3 days prior to ablation   Continue anticoagulation   CBC, BMP on day of procedure     CHICA Mohamud JENNIFER Palmer, 1970, [id number]  Home:[phone] (home) Cell:[phone] (mobile)  Emergency Contact: Nubia Palmer [phone]  PCP: Angélica Dc, [phone]    Important patient information for CSC/Cath Lab staff : None    [facility] EP Cath Lab Procedure Order   Ablation Type:  PVI- Atrial Fibrillation  Ordering Provider: Dr Jones  Date Ordered and Prepped: 4/22/2024 Connie Santana RN    Scheduling Information:  Anticipated Case Duration:  Standard ( Case per day SA 2:1, DW 4:1, CHICA 3:1)   Scheduling Timeframe:  Next Available  Scheduling Restrictions: None  Scheduling Contact: Please contact pt to schedule, if you are unable to schedule date within the next 24 hours please contact pt to update on scheduling process  EP RN Follow Up Apt: Schedule EP RN PC visit 3-4 days s/p PVI  MD Preference: Scheduling with ordering provider  Current Device/Device Co Needed for Procedure: None NoneNone  Pre-Procedural Testing needed: None  Mapping System Required:  Carto (Dr Abbasi and Dr Jones)  ICE Needed:  Yes  Anesthesia:General Anesthesia    [facility] EP Cath Lab Prep   H&P:  Compled by cardiology on 4-22-24 if scheduled within 30 days, pt will need RN education and Labs apt within 3 days of procedure. If pts PVI scheduled outside of 30 days, pt to schedule H&P with EP YARITZA, RN Teach, and Labs within 30 days of PVI  Pre-op Labs: CBC, BMP, Beta HcG if appropriate, and INR if on Warfarin will be ordered AM of procedure, if not completed at pre-op H&P  within 7 days of procedure.  T&S Pre-Procedure Review: Does not need for PVI procedures  Medical Records Pertinent for Procedure:   Echo 4-4-24 EF 45-50%; EKG 3-14-24 Afib @117  Iodinated Contrast Dye Allergies (Does not include Shellfish, Egg, and/or Iodine Allergy): NA  GLP-1 Protocol: If on Dulaglutide (Trulicity) (weekly)- Injection hold 7 days prior to procedure  , Exenatide extended release (Bydureon bcise) (weekly)- Injection hold 7 days prior to procedure, Exenatide (Byetta) (twice daily)- Oral Tablet hold day prior and morning of procedure and for Injection hold 7 days prior to procedure, Semaglutide (Ozempic) (weekly)- Injection and Oral hold 7 days prior to procedure, Liraglutide (Victoza, Saxenda) (daily)- Injection hold day prior and morning of procedure  Follow Up S/P: EP RN 3-4 PC Visit and EP YARITZA 6wk (To be scheduled at time of case scheduling)    Allergies   Allergen Reactions    Metoprolol Itching     Itching started <1h after taking this med 2x       Current Outpatient Medications:     diltiazem ER (DILT-XR) 240 MG 24 hr ER beaded capsule, Take 1 capsule (240 mg) by mouth daily, Disp: 90 capsule, Rfl: 2    EPINEPHrine (ANY BX GENERIC EQUIV) 0.3 MG/0.3ML injection 2-pack, Inject 0.3 mg into the muscle, Disp: , Rfl:     hydrochlorothiazide (HYDRODIURIL) 25 MG tablet, Take 1 tablet (25 mg) by mouth daily, Disp: 90 tablet, Rfl: 3    losartan (COZAAR) 100 MG tablet, Take 1 tablet (100 mg) by mouth daily, Disp: 90 tablet, Rfl: 3    metFORMIN (GLUCOPHAGE) 500 MG tablet, Take 1 tablet (500 mg) by mouth 2 times daily (with meals), Disp: 40 tablet, Rfl: 3    rivaroxaban ANTICOAGULANT (XARELTO) 20 MG TABS tablet, Take 1 tablet (20 mg) by mouth daily (with dinner), Disp: 30 tablet, Rfl: 3    rosuvastatin (CRESTOR) 20 MG tablet, Take 1 tablet (20 mg) by mouth daily, Disp: 90 tablet, Rfl: 3    Documentation Date:4/22/2024 10:29 AM  Connie Santana RN

## 2024-04-22 NOTE — PROGRESS NOTES
HEART CARE ENCOUNTER CONSULTATON NOTE      [facility]  [phone]      Assessment/Recommendations   Assessment/Plan:    Cayetano Palmer is a very pleasant 53 year old male with PMH of persistent atrial fibrillation, cardiomyopathy (LVEF 45-50%), CAD, HTN, DM, obesity who presents today to the EP clinic.    Persistent atrial fibrillation  - We reviewed the pathophysiology of atrial fibrillation and management considerations including stroke risk and anticoagulation, rate control, cardioversion, antiarrhythmic drug therapy, and catheter ablation. We discussed atrial fibrillation ablation procedures, anticipated success rates, the potential need for re-do ablation vs addition of anti-arrhythmic drugs, procedural risks (including groin bleeding, tamponade, phrenic or esophageal injury, stroke, pulmonary vein stenosis) and recovery expectations.  - On Diltiazem currently he would like to proceed with an ablation given his cardiomyopathy  - we discussed the ongoing importance of lifestyle modification (maintaining a healthy weight, sleep apnea diagnosis and management, alcohol avoidance) as part of a long term strategy for atrial fibrillation management      2. Anticoagulation  - CHADSVASc score 4  - Xarelto     3. Cardiomyopathy  - continue losartan  - continue Diltiazem     4. CAD  - stress test negative  - continue current meds    5. HTN  - well controlled    6. RADHA  - uses CPAP      Time spent: 60 minutes spent on the date of the encounter doing chart review, history and exam, documentation and further activities as noted above.    The longitudinal plan of care for the condition(s) below were addressed during this visit. Due to the added complexity in care, I will continue support in the subsequent management of this condition(s) and with the ongoing continuity of care of this condition(s).          History of Present Illness/Subjective    HPI: Cayetano Palmer is a very pleasant 53 year old male  "with PMH of persistent atrial fibrillation, cardiomyopathy (LVEF 45-50%), CAD, HTN, DM, obesity who presents today to the EP clinic.    Cayetano was diagnosed with atrial fibrillation in March 2024. He was diagnosed with a pneumonia in March for which he was given antibiotics. He continued to have symptoms of fatigue and shortness of breath even after a week of antibiotics so he went back to the ER and was diagnosed with AF at the time.     He is currently on Diltiazem 240 mg daily and Xarelto for anticoagulation.    He had a TTE which showed an LVEF of 45-50%. A nuclear stress test was subsequently ordered which showed no signs of ischemia.    Alcohol use- about twice a year    RADHA - Uses CPAP regularly        Recent Echocardiogram Results (personally reviewed):    April 2024    Interpretation Summary     The left ventricle is normal in size. There is moderate concentric left  ventricular hypertrophy.  Difficult to accurately estimate LV systolic due to irregular filling cycles,  however grossly the the LVEF appears mildly reduced in the 45-50% range. There  is diffuse hypokinesis of the left ventricle.    Stress test       Lexiscan stress ECG negative for ischemia.    The nuclear stress test is abnormal.  There is a small area of nontransmural infarction involving the mid to basal inferior and inferolateral wall.  No ischemia is identified.    The left ventricular ejection fraction at stress is 42% with global hypokinesis.    There is no prior study for comparison.    Labs below reviewed personally     Physical Examination  Review of Systems   Vitals: /70 (BP Location: Right arm, Patient Position: Sitting, Cuff Size: Adult Large)   Pulse 60   Resp 16   Ht 1.753 m (5' 9\")   Wt 133.8 kg (295 lb)   BMI 43.56 kg/m    BMI= Body mass index is 43.56 kg/m .  Wt Readings from Last 3 Encounters:   04/22/24 133.8 kg (295 lb)   03/26/24 132.1 kg (291 lb 3.2 oz)   03/22/24 133.4 kg (294 lb)       General Appearance:   " no distress, normal body habitus   ENT/Mouth: membranes moist, no oral lesions or bleeding gums.      EYES:  no scleral icterus, normal conjunctivae   Neck: no carotid bruits or thyromegaly   Chest/Lungs:   lungs are clear to auscultation, no rales or wheezing, no sternal scar, equal chest wall expansion    Cardiovascular:   Irregular. Normal first and second heart sounds with no murmurs, rubs, or gallops; the carotid, radial and posterior tibial pulses are intact, no edema bilaterally    Abdomen:  no organomegaly, masses, bruits, or tenderness; bowel sounds are present   Extremities: no cyanosis or clubbing   Skin: no xanthelasma, warm.    Neurologic: normal  bilateral, no tremors     Psychiatric: alert and oriented x3, calm        Please refer above for cardiac ROS details.        Medical History  Surgical History Family History Social History   No past medical history on file.  No past surgical history on file.  No family history on file.     Social History     Socioeconomic History    Marital status:      Spouse name: Not on file    Number of children: Not on file    Years of education: Not on file    Highest education level: Not on file   Occupational History    Not on file   Tobacco Use    Smoking status: Former     Current packs/day: 0.00     Types: Cigarettes     Quit date: 3/1/2024     Years since quittin.1    Smokeless tobacco: Never   Substance and Sexual Activity    Alcohol use: Not on file    Drug use: Not on file    Sexual activity: Not on file   Other Topics Concern    Not on file   Social History Narrative    Not on file     Social Determinants of Health     Financial Resource Strain: Low Risk  (3/26/2024)    Financial Resource Strain     Within the past 12 months, have you or your family members you live with been unable to get utilities (heat, electricity) when it was really needed?: No   Food Insecurity: Low Risk  (3/26/2024)    Food Insecurity     Within the past 12 months, did you  worry that your food would run out before you got money to buy more?: No     Within the past 12 months, did the food you bought just not last and you didn t have money to get more?: No   Transportation Needs: Low Risk  (3/26/2024)    Transportation Needs     Within the past 12 months, has lack of transportation kept you from medical appointments, getting your medicines, non-medical meetings or appointments, work, or from getting things that you need?: No   Physical Activity: Not on file   Stress: Not on file   Social Connections: Unknown (1/1/2022)    [facility]    Social Connections     Frequency of Communication with Friends and Family: Not on file   Interpersonal Safety: Not on file   Housing Stability: Low Risk  (3/26/2024)    Housing Stability     Do you have housing? : Yes     Are you worried about losing your housing?: No           Medications  Allergies   Current Outpatient Medications   Medication Sig Dispense Refill    diltiazem ER (DILT-XR) 240 MG 24 hr ER beaded capsule Take 1 capsule (240 mg) by mouth daily 30 capsule 2    EPINEPHrine (ANY BX GENERIC EQUIV) 0.3 MG/0.3ML injection 2-pack Inject 0.3 mg into the muscle      hydrochlorothiazide (HYDRODIURIL) 25 MG tablet Take 1 tablet (25 mg) by mouth daily 20 tablet 3    losartan (COZAAR) 100 MG tablet Take 1 tablet (100 mg) by mouth daily 20 tablet 3    metFORMIN (GLUCOPHAGE) 500 MG tablet Take 1 tablet (500 mg) by mouth 2 times daily (with meals) 40 tablet 3    rivaroxaban ANTICOAGULANT (XARELTO) 20 MG TABS tablet Take 1 tablet (20 mg) by mouth daily (with dinner) 30 tablet 3    rosuvastatin (CRESTOR) 20 MG tablet Take 1 tablet (20 mg) by mouth daily 20 tablet 3       Allergies   Allergen Reactions    Metoprolol Itching     Itching started <1h after taking this med 2x          Lab Results    Chemistry/lipid CBC Cardiac Enzymes/BNP/TSH/INR   No results for  "input(s): \"CHOL\", \"HDL\", \"LDL\", \"TRIG\", \"CHOLHDLRATIO\" in the last 44935 hours.  No results for input(s): \"LDL\" in the last 44231 hours.  Recent Labs   Lab Test 03/14/24  1711      POTASSIUM 4.0   CHLORIDE 102   CO2 26   *   BUN 18.0   CR 0.91   GFRESTIMATED >90   SHEELA 9.4     Recent Labs   Lab Test 03/14/24  1711 03/05/24  1813   CR 0.91 0.86     Recent Labs   Lab Test 03/05/24  1813   A1C 9.9*          Recent Labs   Lab Test 03/14/24  1711   WBC 11.1*   HGB 16.3   HCT 46.9   MCV 90        Recent Labs   Lab Test 03/14/24  1711 03/05/24  1813   HGB 16.3 16.1    No results for input(s): \"TROPONINI\" in the last 97994 hours.  Recent Labs   Lab Test 03/05/24  1813   NTBNPI 903*     No results for input(s): \"TSH\" in the last 27653 hours.  Recent Labs   Lab Test 03/14/24  1711   INR 1.10        Vesta Jones MD                                      "

## 2024-04-22 NOTE — LETTER
4/22/2024    Angélica Dc PA-C  [address]  [facility] [address]    RE: Cayetano Palmer       Dear Colleague,     I had the pleasure of seeing Cayetano Palmer in the [facility].    HEART CARE ENCOUNTER CONSULTATON NOTE      [facility]  [phone]      Assessment/Recommendations   Assessment/Plan:    Cayetano Palmer is a very pleasant 53 year old male with PMH of persistent atrial fibrillation, cardiomyopathy (LVEF 45-50%), CAD, HTN, DM, obesity who presents today to the EP clinic.    Persistent atrial fibrillation  - We reviewed the pathophysiology of atrial fibrillation and management considerations including stroke risk and anticoagulation, rate control, cardioversion, antiarrhythmic drug therapy, and catheter ablation. We discussed atrial fibrillation ablation procedures, anticipated success rates, the potential need for re-do ablation vs addition of anti-arrhythmic drugs, procedural risks (including groin bleeding, tamponade, phrenic or esophageal injury, stroke, pulmonary vein stenosis) and recovery expectations.  - On Diltiazem currently he would like to proceed with an ablation given his cardiomyopathy  - we discussed the ongoing importance of lifestyle modification (maintaining a healthy weight, sleep apnea diagnosis and management, alcohol avoidance) as part of a long term strategy for atrial fibrillation management      2. Anticoagulation  - CHADSVASc score 4  - Xarelto     3. Cardiomyopathy  - continue losartan  - continue Diltiazem     4. CAD  - stress test negative  - continue current meds    5. HTN  - well controlled    6. RADHA  - uses CPAP      Time spent: 60 minutes spent on the date of the encounter doing chart review, history and exam, documentation and further activities as noted above.    The longitudinal plan of care for the condition(s) below were addressed during this visit. Due to the added complexity in care, I will continue support in  "the subsequent management of this condition(s) and with the ongoing continuity of care of this condition(s).          History of Present Illness/Subjective    HPI: Cayetano Palmer is a very pleasant 53 year old male with PMH of persistent atrial fibrillation, cardiomyopathy (LVEF 45-50%), CAD, HTN, DM, obesity who presents today to the EP clinic.    Cayetano was diagnosed with atrial fibrillation in March 2024. He was diagnosed with a pneumonia in March for which he was given antibiotics. He continued to have symptoms of fatigue and shortness of breath even after a week of antibiotics so he went back to the ER and was diagnosed with AF at the time.     He is currently on Diltiazem 240 mg daily and Xarelto for anticoagulation.    He had a TTE which showed an LVEF of 45-50%. A nuclear stress test was subsequently ordered which showed no signs of ischemia.    Alcohol use- about twice a year    RADHA - Uses CPAP regularly        Recent Echocardiogram Results (personally reviewed):    April 2024    Interpretation Summary     The left ventricle is normal in size. There is moderate concentric left  ventricular hypertrophy.  Difficult to accurately estimate LV systolic due to irregular filling cycles,  however grossly the the LVEF appears mildly reduced in the 45-50% range. There  is diffuse hypokinesis of the left ventricle.    Stress test       Lexiscan stress ECG negative for ischemia.    The nuclear stress test is abnormal.  There is a small area of nontransmural infarction involving the mid to basal inferior and inferolateral wall.  No ischemia is identified.    The left ventricular ejection fraction at stress is 42% with global hypokinesis.    There is no prior study for comparison.    Labs below reviewed personally     Physical Examination  Review of Systems   Vitals: /70 (BP Location: Right arm, Patient Position: Sitting, Cuff Size: Adult Large)   Pulse 60   Resp 16   Ht 1.753 m (5' 9\")   Wt 133.8 kg (295 lb)   " BMI 43.56 kg/m    BMI= Body mass index is 43.56 kg/m .  Wt Readings from Last 3 Encounters:   24 133.8 kg (295 lb)   24 132.1 kg (291 lb 3.2 oz)   24 133.4 kg (294 lb)       General Appearance:   no distress, normal body habitus   ENT/Mouth: membranes moist, no oral lesions or bleeding gums.      EYES:  no scleral icterus, normal conjunctivae   Neck: no carotid bruits or thyromegaly   Chest/Lungs:   lungs are clear to auscultation, no rales or wheezing, no sternal scar, equal chest wall expansion    Cardiovascular:   Irregular. Normal first and second heart sounds with no murmurs, rubs, or gallops; the carotid, radial and posterior tibial pulses are intact, no edema bilaterally    Abdomen:  no organomegaly, masses, bruits, or tenderness; bowel sounds are present   Extremities: no cyanosis or clubbing   Skin: no xanthelasma, warm.    Neurologic: normal  bilateral, no tremors     Psychiatric: alert and oriented x3, calm        Please refer above for cardiac ROS details.        Medical History  Surgical History Family History Social History   No past medical history on file.  No past surgical history on file.  No family history on file.     Social History     Socioeconomic History    Marital status:      Spouse name: Not on file    Number of children: Not on file    Years of education: Not on file    Highest education level: Not on file   Occupational History    Not on file   Tobacco Use    Smoking status: Former     Current packs/day: 0.00     Types: Cigarettes     Quit date: 3/1/2024     Years since quittin.1    Smokeless tobacco: Never   Substance and Sexual Activity    Alcohol use: Not on file    Drug use: Not on file    Sexual activity: Not on file   Other Topics Concern    Not on file   Social History Narrative    Not on file     Social Determinants of Health     Financial Resource Strain: Low Risk  (3/26/2024)    Financial Resource Strain     Within the past 12 months, have you  or your family members you live with been unable to get utilities (heat, electricity) when it was really needed?: No   Food Insecurity: Low Risk  (3/26/2024)    Food Insecurity     Within the past 12 months, did you worry that your food would run out before you got money to buy more?: No     Within the past 12 months, did the food you bought just not last and you didn t have money to get more?: No   Transportation Needs: Low Risk  (3/26/2024)    Transportation Needs     Within the past 12 months, has lack of transportation kept you from medical appointments, getting your medicines, non-medical meetings or appointments, work, or from getting things that you need?: No   Physical Activity: Not on file   Stress: Not on file   Social Connections: Unknown (1/1/2022)    [facility]    Social Connections     Frequency of Communication with Friends and Family: Not on file   Interpersonal Safety: Not on file   Housing Stability: Low Risk  (3/26/2024)    Housing Stability     Do you have housing? : Yes     Are you worried about losing your housing?: No           Medications  Allergies   Current Outpatient Medications   Medication Sig Dispense Refill    diltiazem ER (DILT-XR) 240 MG 24 hr ER beaded capsule Take 1 capsule (240 mg) by mouth daily 30 capsule 2    EPINEPHrine (ANY BX GENERIC EQUIV) 0.3 MG/0.3ML injection 2-pack Inject 0.3 mg into the muscle      hydrochlorothiazide (HYDRODIURIL) 25 MG tablet Take 1 tablet (25 mg) by mouth daily 20 tablet 3    losartan (COZAAR) 100 MG tablet Take 1 tablet (100 mg) by mouth daily 20 tablet 3    metFORMIN (GLUCOPHAGE) 500 MG tablet Take 1 tablet (500 mg) by mouth 2 times daily (with meals) 40 tablet 3    rivaroxaban ANTICOAGULANT (XARELTO) 20 MG TABS tablet Take 1 tablet (20 mg) by mouth daily (with dinner) 30 tablet 3    rosuvastatin (CRESTOR) 20 MG tablet Take 1 tablet (20 mg) by mouth daily  "20 tablet 3       Allergies   Allergen Reactions    Metoprolol Itching     Itching started <1h after taking this med 2x          Lab Results    Chemistry/lipid CBC Cardiac Enzymes/BNP/TSH/INR   No results for input(s): \"CHOL\", \"HDL\", \"LDL\", \"TRIG\", \"CHOLHDLRATIO\" in the last 70156 hours.  No results for input(s): \"LDL\" in the last 55961 hours.  Recent Labs   Lab Test 03/14/24  1711      POTASSIUM 4.0   CHLORIDE 102   CO2 26   *   BUN 18.0   CR 0.91   GFRESTIMATED >90   SHEELA 9.4     Recent Labs   Lab Test 03/14/24  1711 03/05/24  1813   CR 0.91 0.86     Recent Labs   Lab Test 03/05/24  1813   A1C 9.9*          Recent Labs   Lab Test 03/14/24  1711   WBC 11.1*   HGB 16.3   HCT 46.9   MCV 90        Recent Labs   Lab Test 03/14/24  1711 03/05/24  1813   HGB 16.3 16.1    No results for input(s): \"TROPONINI\" in the last 78994 hours.  Recent Labs   Lab Test 03/05/24  1813   NTBNPI 903*     No results for input(s): \"TSH\" in the last 18299 hours.  Recent Labs   Lab Test 03/14/24  1711   INR 1.10        Vesta Jones MD               Thank you for allowing me to participate in the care of your patient.      Sincerely,     Vesta Jones MD     [facility]  cc:   Obed Hannon MD  [address]  [address]  [place] [address]      "

## 2024-04-23 DIAGNOSIS — I48.0 PAROXYSMAL ATRIAL FIBRILLATION (H): Primary | ICD-10-CM

## 2024-04-24 DIAGNOSIS — I48.0 PAROXYSMAL ATRIAL FIBRILLATION (H): Primary | ICD-10-CM

## 2024-05-03 DIAGNOSIS — E11.9 TYPE 2 DIABETES MELLITUS WITHOUT COMPLICATION, WITHOUT LONG-TERM CURRENT USE OF INSULIN (H): ICD-10-CM

## 2024-05-12 ENCOUNTER — HEALTH MAINTENANCE LETTER (OUTPATIENT)
Age: 54
End: 2024-05-12

## 2024-05-13 ENCOUNTER — NURSE TRIAGE (OUTPATIENT)
Dept: CARDIOLOGY | Facility: CLINIC | Age: 54
End: 2024-05-13
Payer: COMMERCIAL

## 2024-05-13 NOTE — CONFIDENTIAL NOTE
Returned call to pt. He reports his HR has calmed down now and he feels very tired.    He reports he used to be in AF and could not feel it. Since this Saturday he has had 2 episodes with racing heart and symptomatic.     Pt confirms he understands these are symptoms related to AF but he has not experienced.     Pt understands that his ablation is next week but he wanted to see if there was anything he needed to do. We discussed activity level, stress, hydration and avoiding caffeine.     Tammi Mercado RN

## 2024-05-13 NOTE — TELEPHONE ENCOUNTER
"Received a call from the call center to discuss A fib episodes.  Spoke to Cayetano, who says he is scheduled to have an A fib ablation 5/23/24.  He says he has had a couple of bad episodes and is not sure what to do next.  He says on Saturday, he was doing light yard work, and suddenly became lightheaded and had to sit down. He says he felt like he couldn't get up for about 30-40 minutes, and then was very tired after that.  He says today he was at work, and felt his heart was beating a million miles an hour. He was lightheaded, and had to sit down. He says this episode was not as not quite as bad as the other one, lasting about 45 minutes. He says he had someone drive him home.  He says now he feels like he wants to take a nap. He says he can feel his heart \"jumping\" when he feels his carotid pulse, but not racing like before. He does not have any device to check his VS.   He says he is taking Diltiazem and Xarelto as prescribed.  He would like to discuss next steps.  Advised a message will be sent to [facility] for follow up.      1. DESCRIPTION: \"Please describe your heart rate or heartbeat that you are having\" (e.g., fast/slow, regular/irregular, skipped or extra beats, \"palpitations\") irregular, fast at times  2. ONSET: \"When did it start?\" (Minutes, hours or days) Saturday and today  3. DURATION: \"How long does it last\" (e.g., seconds, minutes, hours) 30-45 minutes  4. PATTERN \"Does it come and go, or has it been constant since it started?\" \"Does it get worse with exertion?\" \"Are you feeling it now?\" Comes and goes  5. TAP: \"Using your hand, can you tap out what you are feeling on a chair or table in front of you, so that I can hear?\" (Note: not all patients can do this)  6. HEART RATE: \"Can you tell me your heart rate?\" \"How many beats in 15 seconds?\" (Note: not all patients can do this)   7. RECURRENT SYMPTOM: \"Have you ever had this before?\" If Yes, ask: \"When was the last time?\" and \"What happened " "that time?\"  8. CAUSE: \"What do you think is causing the palpitations?\"  9. CARDIAC HISTORY: \"Do you have any history of heart disease?\" (e.g., heart attack, angina, bypass surgery, angioplasty, arrhythmia) A fib  10. OTHER SYMPTOMS: \"Do you have any other symptoms?\" (e.g., dizziness, chest pain, sweating, difficulty breathing) lightheadedness, fatigue  11. PREGNANCY: \"Is there any chance you are pregnant?\" \"When was your last menstrual period?\"  Additional Information   Negative: Heart beating very rapidly (e.g., > 140 / minute) and present now  (Exception: During exercise.)   Negative: Difficulty breathing   Negative: Dizziness, lightheadedness, or weakness    Protocols used: Heart Rate and Heartbeat [id number]    "

## 2024-05-14 PROBLEM — I48.19 PERSISTENT ATRIAL FIBRILLATION (H): Status: ACTIVE | Noted: 2024-05-14

## 2024-05-14 NOTE — H&P (VIEW-ONLY)
[facility]  Cardiac Electrophysiology  [address]  [place] [address]   Office: [phone]  Fax: [phone]     HEART CARE ELECTROPHYSIOLOGY NOTE     Primary Care: Angélica Dc MD       Assessment/Recommendations     Persistent atrial fibrillation/stage 3B: We discussed pulmonary vein isolation ablation procedure including <1-2% risk for major complication, anticipated success rates, recovery and follow-up.  Medical and surgical history reviewed and updated. Current medications and allergies reviewed and updated as appropriate. No personal or family history of adverse reactions to anesthesia or abnormal bleeding with surgery.    [id number] score of 4-CAD, HTN, DM, cardiomyopathy.  He has upcoming dental procedure, advised to continue OAC uninterrupted for 3 months following ablation    HFrEF: Presumably tachycardia mediated with recently negative MPI. Compensated without overt hypervolemia. On ARB, HCTZ    HTN: Controlled on current regimen    RADHA: Consistent use of CPAP    Plan:   -Continue Xarelto 20 mg daily with dinner for stroke prophylaxis  -Hold diltiazem beginning 3 days prior to ablation  -Start Protonix 40mg every day beginning 3 days prior to ablation and continuing for 6 weeks thereafter  -EP follow-up 6 weeks post ablation     History of Present Illness/Subjective    Cayetano Palmer is a 53 year old male with past medical history significant for persistent AF, HFrEF, CAD, HTN, non-insulin-dependent type 2 diabetes, obesity, RADHA, nicotine use, seen today for history and physical prior to atrial fibrillation ablation.  He was first documented in atrial fibrillation 3/2024, after ongoing fatigue and dyspnea following treatment for pneumonia.  TTE was significant for LVEF 45-50%, mild LAE.  He notes his activity tolerance has been poor recently, not able to do as much yard work without rest.  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 He denies chest discomfort, palpitations, shortness of breath, lightheadedness/dizziness, pedal edema, or syncope.     Data Review     Arrhythmia hx:   Dx/date: Persistent AF 3/2024  Sx: Fatigue, dyspnea, lightheadedness, tachypalpitations  [id number], OAC: 4-CAD, HTN, DM, cardiomyopathy; rivaroxaban  Rate control: Diltiazem  AAD: None  DCCV: None  Ablation: None    EK/15/2024  bpm  Personally reviewed.     TTE 2024:   The left ventricle is normal in size. There is moderate concentric left  ventricular hypertrophy.  Difficult to accurately estimate LV systolic due to irregular filling cycles,  however grossly the the LVEF appears mildly reduced in the 45-50% range. There  is diffuse hypokinesis of the left ventricle.  The right ventricle is normal size. Right ventricular function cannot be  assessed due to poor image quality.  The left atrium is mildly dilated. Right atrium not well visualized.  IVC diameter <2.1 cm collapsing >50% with sniff suggests a normal RA pressure  of 3 mmHg.  Right ventricle systolic pressure estimate normal  There is no comparison study available.    Coronary Angiogram 2021  PRESENTATION / INDICATIONS  * CP, elevated troponin  VASCULAR ACCESS  * The radial artery was too small for vascular access.  * Using ultrasound guidance and a percutaneous technique, the right common femoral artery was accessed. Ultrasound was used to confirm vessel patency, localizing needle into the lumen of the vessel. An image was saved for the medical record.  DIAGNOSTIC - CORONARY  * Right dominant coronary artery system and large caliber vessels with mild coronary calcification.  * The left main artery has 20% eccentric stenosis in the mid portion.  * The LAD has minimal disease. Diagonals are moderate caliber with minimal disease.  * The circumflex artery has minimal disease proximally. OM1 is smaller with 20% ostial stenosis. OM2 is large and normal.  * The RCA  "is very large with 60% proximal stenosis and 30% mid stenosis. The remainder of the vessel has minimal disease. Baseline FFR measured in the RCA was 1.0 and hyperemic (IV adenosine infusion) FFR was 0.93 indicating the disease is not hemodynamically significant.       I have reviewed and updated the patient's past medical history, allergies and medication list.               Physical Examination Review of Systems   /68 (BP Location: Right arm, Patient Position: Sitting, Cuff Size: Adult Large)   Pulse 112   Resp 16   Ht 1.753 m (5' 9\")   Wt 132.9 kg (293 lb)   BMI 43.27 kg/m      Body mass index is 43.27 kg/m .    Wt Readings from Last 3 Encounters:   05/15/24 132.9 kg (293 lb)   24 133.8 kg (295 lb)   24 132.1 kg (291 lb 3.2 oz)     General   Appearance:   Alert and oriented, in no acute distress.    HEENT:  Normocephalic and atraumatic. Conjunctiva and sclera are clear. Moist oral mucosa.    Neck: No JVP, carotid bruit or obvious thyromegaly.   Lungs:   Respirations unlabored. Clear bilaterally with no rales, rhonchi, or wheezes.     Cardiovascular:   Rhythm is irregular. S1 and S2 are normal. No significant murmur is present. Lower extremities demonstrate 1+ edema. Posterior tibial pulses are intact bilaterally.   Extremities: No cyanosis or clubbing   Skin: Skin is warm, dry, and otherwise intact.   Neurologic: Gait not asssessed. Mood and affect appropriate.                                                Medical History  Surgical History Family History Social History   No past medical history on file. No past surgical history on file. No family history on file. Social History     Tobacco Use     Smoking status: Former     Current packs/day: 0.00     Types: Cigarettes     Quit date: 3/1/2024     Years since quittin.2     Smokeless tobacco: Never          Medications  Allergies   Current Outpatient Medications   Medication Sig Dispense Refill     diltiazem ER (DILT-XR) 240 MG 24 hr ER " "beaded capsule Take 1 capsule (240 mg) by mouth daily 90 capsule 2     hydrochlorothiazide (HYDRODIURIL) 25 MG tablet Take 1 tablet (25 mg) by mouth daily 90 tablet 3     losartan (COZAAR) 100 MG tablet Take 1 tablet (100 mg) by mouth daily 90 tablet 3     metFORMIN (GLUCOPHAGE) 500 MG tablet TAKE 1 TABLET BY MOUTH TWICE A DAY WITH MEALS 180 tablet 2     rivaroxaban ANTICOAGULANT (XARELTO) 20 MG TABS tablet Take 1 tablet (20 mg) by mouth daily (with dinner) 30 tablet 3     rosuvastatin (CRESTOR) 20 MG tablet Take 1 tablet (20 mg) by mouth daily 90 tablet 3     EPINEPHrine (ANY BX GENERIC EQUIV) 0.3 MG/0.3ML injection 2-pack Inject 0.3 mg into the muscle (Patient not taking: Reported on 5/15/2024)       pantoprazole (PROTONIX) 40 MG EC tablet Take 1 tablet (40 mg) by mouth daily 45 tablet 0    Allergies   Allergen Reactions     Metoprolol Itching     Itching started <1h after taking this med 2x         Lab Results    Chemistry/lipid CBC Cardiac Enzymes/BNP/TSH/INR   Lab Results   Component Value Date    BUN 16.6 05/15/2024     05/15/2024    CO2 25 05/15/2024     No results found for: \"CREATININE\"    No results found for: \"CHOL\", \"HDL\", \"LDL\", \"CHOLHDL\"   Lab Results   Component Value Date    WBC 7.3 05/15/2024    HGB 16.1 05/15/2024    HCT 46.1 05/15/2024    MCV 88 05/15/2024     05/15/2024    Lab Results   Component Value Date    INR 1.10 03/14/2024        26 minutes spent reviewing prior records (including documentation, laboratory studies, cardiac testing/imaging), history and physical exam, planning, and subsequent documentation.     The longitudinal plan of care for the diagnosis(es)/condition(s) as documented were addressed during this visit. Due to the added complexity in care, I will continue to support Cayetano in the subsequent management and with ongoing continuity of care.     This note has been dictated using voice recognition software. Any grammatical, typographical, or context distortions are " unintentional and inherent to the software.    Priti Horne CNP  Clinical Cardiac Electrophysiology  [facility]  Clinic and [phone]  Fax: [phone]  Electrophysiology Nurses: [phone]

## 2024-05-14 NOTE — PROGRESS NOTES
[facility]  Cardiac Electrophysiology  [address]  [place] [address]   Office: [phone]  Fax: [phone]     HEART CARE ELECTROPHYSIOLOGY NOTE     Primary Care: Angélica Dc MD       Assessment/Recommendations     Persistent atrial fibrillation/stage 3B: We discussed pulmonary vein isolation ablation procedure including <1-2% risk for major complication, anticipated success rates, recovery and follow-up.  Medical and surgical history reviewed and updated. Current medications and allergies reviewed and updated as appropriate. No personal or family history of adverse reactions to anesthesia or abnormal bleeding with surgery.    [id number] score of 4-CAD, HTN, DM, cardiomyopathy.  He has upcoming dental procedure, advised to continue OAC uninterrupted for 3 months following ablation    HFrEF: Presumably tachycardia mediated with recently negative MPI. Compensated without overt hypervolemia. On ARB, HCTZ    HTN: Controlled on current regimen    RADHA: Consistent use of CPAP    Plan:   -Continue Xarelto 20 mg daily with dinner for stroke prophylaxis  -Hold diltiazem beginning 3 days prior to ablation  -Start Protonix 40mg every day beginning 3 days prior to ablation and continuing for 6 weeks thereafter  -EP follow-up 6 weeks post ablation     History of Present Illness/Subjective    Cayetano Palmer is a 53 year old male with past medical history significant for persistent AF, HFrEF, CAD, HTN, non-insulin-dependent type 2 diabetes, obesity, RADHA, nicotine use, seen today for history and physical prior to atrial fibrillation ablation.  He was first documented in atrial fibrillation 3/2024, after ongoing fatigue and dyspnea following treatment for pneumonia.  TTE was significant for LVEF 45-50%, mild LAE.  He notes his activity tolerance has been poor recently, not able to do as much yard work without rest.  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 He denies chest discomfort, palpitations, shortness of breath, lightheadedness/dizziness, pedal edema, or syncope.     Data Review     Arrhythmia hx:   Dx/date: Persistent AF 3/2024  Sx: Fatigue, dyspnea, lightheadedness, tachypalpitations  [id number], OAC: 4-CAD, HTN, DM, cardiomyopathy; rivaroxaban  Rate control: Diltiazem  AAD: None  DCCV: None  Ablation: None    EK/15/2024  bpm  Personally reviewed.     TTE 2024:   The left ventricle is normal in size. There is moderate concentric left  ventricular hypertrophy.  Difficult to accurately estimate LV systolic due to irregular filling cycles,  however grossly the the LVEF appears mildly reduced in the 45-50% range. There  is diffuse hypokinesis of the left ventricle.  The right ventricle is normal size. Right ventricular function cannot be  assessed due to poor image quality.  The left atrium is mildly dilated. Right atrium not well visualized.  IVC diameter <2.1 cm collapsing >50% with sniff suggests a normal RA pressure  of 3 mmHg.  Right ventricle systolic pressure estimate normal  There is no comparison study available.    Coronary Angiogram 2021  PRESENTATION / INDICATIONS  * CP, elevated troponin  VASCULAR ACCESS  * The radial artery was too small for vascular access.  * Using ultrasound guidance and a percutaneous technique, the right common femoral artery was accessed. Ultrasound was used to confirm vessel patency, localizing needle into the lumen of the vessel. An image was saved for the medical record.  DIAGNOSTIC - CORONARY  * Right dominant coronary artery system and large caliber vessels with mild coronary calcification.  * The left main artery has 20% eccentric stenosis in the mid portion.  * The LAD has minimal disease. Diagonals are moderate caliber with minimal disease.  * The circumflex artery has minimal disease proximally. OM1 is smaller with 20% ostial stenosis. OM2 is large and normal.  * The RCA  "is very large with 60% proximal stenosis and 30% mid stenosis. The remainder of the vessel has minimal disease. Baseline FFR measured in the RCA was 1.0 and hyperemic (IV adenosine infusion) FFR was 0.93 indicating the disease is not hemodynamically significant.       I have reviewed and updated the patient's past medical history, allergies and medication list.               Physical Examination Review of Systems   /68 (BP Location: Right arm, Patient Position: Sitting, Cuff Size: Adult Large)   Pulse 112   Resp 16   Ht 1.753 m (5' 9\")   Wt 132.9 kg (293 lb)   BMI 43.27 kg/m      Body mass index is 43.27 kg/m .    Wt Readings from Last 3 Encounters:   05/15/24 132.9 kg (293 lb)   24 133.8 kg (295 lb)   24 132.1 kg (291 lb 3.2 oz)     General   Appearance:   Alert and oriented, in no acute distress.    HEENT:  Normocephalic and atraumatic. Conjunctiva and sclera are clear. Moist oral mucosa.    Neck: No JVP, carotid bruit or obvious thyromegaly.   Lungs:   Respirations unlabored. Clear bilaterally with no rales, rhonchi, or wheezes.     Cardiovascular:   Rhythm is irregular. S1 and S2 are normal. No significant murmur is present. Lower extremities demonstrate 1+ edema. Posterior tibial pulses are intact bilaterally.   Extremities: No cyanosis or clubbing   Skin: Skin is warm, dry, and otherwise intact.   Neurologic: Gait not asssessed. Mood and affect appropriate.                                                Medical History  Surgical History Family History Social History   No past medical history on file. No past surgical history on file. No family history on file. Social History     Tobacco Use    Smoking status: Former     Current packs/day: 0.00     Types: Cigarettes     Quit date: 3/1/2024     Years since quittin.2    Smokeless tobacco: Never          Medications  Allergies   Current Outpatient Medications   Medication Sig Dispense Refill    diltiazem ER (DILT-XR) 240 MG 24 hr ER " "beaded capsule Take 1 capsule (240 mg) by mouth daily 90 capsule 2    hydrochlorothiazide (HYDRODIURIL) 25 MG tablet Take 1 tablet (25 mg) by mouth daily 90 tablet 3    losartan (COZAAR) 100 MG tablet Take 1 tablet (100 mg) by mouth daily 90 tablet 3    metFORMIN (GLUCOPHAGE) 500 MG tablet TAKE 1 TABLET BY MOUTH TWICE A DAY WITH MEALS 180 tablet 2    rivaroxaban ANTICOAGULANT (XARELTO) 20 MG TABS tablet Take 1 tablet (20 mg) by mouth daily (with dinner) 30 tablet 3    rosuvastatin (CRESTOR) 20 MG tablet Take 1 tablet (20 mg) by mouth daily 90 tablet 3    EPINEPHrine (ANY BX GENERIC EQUIV) 0.3 MG/0.3ML injection 2-pack Inject 0.3 mg into the muscle (Patient not taking: Reported on 5/15/2024)      pantoprazole (PROTONIX) 40 MG EC tablet Take 1 tablet (40 mg) by mouth daily 45 tablet 0    Allergies   Allergen Reactions    Metoprolol Itching     Itching started <1h after taking this med 2x         Lab Results    Chemistry/lipid CBC Cardiac Enzymes/BNP/TSH/INR   Lab Results   Component Value Date    BUN 16.6 05/15/2024     05/15/2024    CO2 25 05/15/2024     No results found for: \"CREATININE\"    No results found for: \"CHOL\", \"HDL\", \"LDL\", \"CHOLHDL\"   Lab Results   Component Value Date    WBC 7.3 05/15/2024    HGB 16.1 05/15/2024    HCT 46.1 05/15/2024    MCV 88 05/15/2024     05/15/2024    Lab Results   Component Value Date    INR 1.10 03/14/2024        26 minutes spent reviewing prior records (including documentation, laboratory studies, cardiac testing/imaging), history and physical exam, planning, and subsequent documentation.     The longitudinal plan of care for the diagnosis(es)/condition(s) as documented were addressed during this visit. Due to the added complexity in care, I will continue to support Cayetano in the subsequent management and with ongoing continuity of care.     This note has been dictated using voice recognition software. Any grammatical, typographical, or context distortions are " unintentional and inherent to the software.    Priti Horne CNP  Clinical Cardiac Electrophysiology  [facility]  Clinic and [phone]  Fax: [phone]  Electrophysiology Nurses: [phone]

## 2024-05-15 ENCOUNTER — OFFICE VISIT (OUTPATIENT)
Dept: CARDIOLOGY | Facility: CLINIC | Age: 54
End: 2024-05-15
Payer: COMMERCIAL

## 2024-05-15 ENCOUNTER — LAB (OUTPATIENT)
Dept: CARDIOLOGY | Facility: CLINIC | Age: 54
End: 2024-05-15
Payer: COMMERCIAL

## 2024-05-15 ENCOUNTER — ALLIED HEALTH/NURSE VISIT (OUTPATIENT)
Dept: CARDIOLOGY | Facility: CLINIC | Age: 54
End: 2024-05-15
Payer: COMMERCIAL

## 2024-05-15 VITALS
RESPIRATION RATE: 16 BRPM | DIASTOLIC BLOOD PRESSURE: 68 MMHG | HEART RATE: 112 BPM | BODY MASS INDEX: 43.4 KG/M2 | SYSTOLIC BLOOD PRESSURE: 120 MMHG | HEIGHT: 69 IN | WEIGHT: 293 LBS

## 2024-05-15 DIAGNOSIS — I48.0 PAROXYSMAL ATRIAL FIBRILLATION (H): ICD-10-CM

## 2024-05-15 DIAGNOSIS — I48.19 PERSISTENT ATRIAL FIBRILLATION (H): Primary | ICD-10-CM

## 2024-05-15 DIAGNOSIS — I42.9 SECONDARY CARDIOMYOPATHY (H): ICD-10-CM

## 2024-05-15 DIAGNOSIS — I48.0 PAROXYSMAL ATRIAL FIBRILLATION (H): Primary | ICD-10-CM

## 2024-05-15 DIAGNOSIS — I10 ESSENTIAL HYPERTENSION: ICD-10-CM

## 2024-05-15 LAB
ANION GAP SERPL CALCULATED.3IONS-SCNC: 10 MMOL/L (ref 7–15)
ATRIAL RATE - MUSE: NORMAL BPM
BUN SERPL-MCNC: 16.6 MG/DL (ref 6–20)
CALCIUM SERPL-MCNC: 9.2 MG/DL (ref 8.6–10)
CHLORIDE SERPL-SCNC: 102 MMOL/L (ref 98–107)
CREAT SERPL-MCNC: 0.9 MG/DL (ref 0.67–1.17)
DEPRECATED HCO3 PLAS-SCNC: 25 MMOL/L (ref 22–29)
DIASTOLIC BLOOD PRESSURE - MUSE: NORMAL MMHG
EGFRCR SERPLBLD CKD-EPI 2021: >90 ML/MIN/1.73M2
ERYTHROCYTE [DISTWIDTH] IN BLOOD BY AUTOMATED COUNT: 12.4 % (ref 10–15)
GLUCOSE SERPL-MCNC: 259 MG/DL (ref 70–99)
HCT VFR BLD AUTO: 46.1 % (ref 40–53)
HGB BLD-MCNC: 16.1 G/DL (ref 13.3–17.7)
INTERPRETATION ECG - MUSE: NORMAL
MCH RBC QN AUTO: 30.6 PG (ref 26.5–33)
MCHC RBC AUTO-ENTMCNC: 34.9 G/DL (ref 31.5–36.5)
MCV RBC AUTO: 88 FL (ref 78–100)
P AXIS - MUSE: NORMAL DEGREES
PLATELET # BLD AUTO: 196 10E3/UL (ref 150–450)
POTASSIUM SERPL-SCNC: 3.7 MMOL/L (ref 3.4–5.3)
PR INTERVAL - MUSE: NORMAL MS
QRS DURATION - MUSE: 98 MS
QT - MUSE: 328 MS
QTC - MUSE: 447 MS
R AXIS - MUSE: -76 DEGREES
RBC # BLD AUTO: 5.27 10E6/UL (ref 4.4–5.9)
SODIUM SERPL-SCNC: 137 MMOL/L (ref 135–145)
SYSTOLIC BLOOD PRESSURE - MUSE: NORMAL MMHG
T AXIS - MUSE: 87 DEGREES
VENTRICULAR RATE- MUSE: 112 BPM
WBC # BLD AUTO: 7.3 10E3/UL (ref 4–11)

## 2024-05-15 PROCEDURE — 85027 COMPLETE CBC AUTOMATED: CPT

## 2024-05-15 PROCEDURE — 80048 BASIC METABOLIC PNL TOTAL CA: CPT

## 2024-05-15 PROCEDURE — 99214 OFFICE O/P EST MOD 30 MIN: CPT | Performed by: NURSE PRACTITIONER

## 2024-05-15 PROCEDURE — 36415 COLL VENOUS BLD VENIPUNCTURE: CPT

## 2024-05-15 PROCEDURE — 93000 ELECTROCARDIOGRAM COMPLETE: CPT | Performed by: STUDENT IN AN ORGANIZED HEALTH CARE EDUCATION/TRAINING PROGRAM

## 2024-05-15 PROCEDURE — 99207 PR NO CHARGE NURSE ONLY: CPT

## 2024-05-15 RX ORDER — PANTOPRAZOLE SODIUM 40 MG/1
40 TABLET, DELAYED RELEASE ORAL DAILY
Qty: 45 TABLET | Refills: 0 | Status: SHIPPED | OUTPATIENT
Start: 2024-05-15 | End: 2024-06-24

## 2024-05-15 NOTE — PROGRESS NOTES
Pre-Procedure Pulmonary Vein Ablation (AF) Education    Procedure: PVI with Dr Jones on 5/23/24 with arrival time 5:30am    COVID: Pt denies COVID like symptoms, and is aware if he/she develops COVID like symptoms they would need to complete an at home with a rapid antigen COVID test 1-2 days prior to your procedure date. If COVID + pt is aware the procedure will need to be rescheduled, and to contact CV scheduling as soon as possible    Type & Screen: Is not required for PVI Ablation    Pre-Op H&P: Completed today with EP YARITZA- See record in Epic    Education:   Reviewed with pt in Clinic today  Pre-Procedure Instruction: NPO after midnight pre procedure, Defined NPO, Remove all jewelry and leave all valuables at home, Shower prior to arrival, Anesthesia and intubation plan/orders, Intra-procedure PVI process, Post- PVI procedure expectations/recovery, Transportation requirements and arrangements post procedure, Post-procedure follow up process, Letter sent to pt via [place] and mail with written instructions (Refer to letters tab), Lab results would be called to pt if abnormal  Risks:   Atrial Fibrillation Ablation/Left Atrial Ablation  Cardiac Ablation  <1% Hypotension, Hemorrhage, Thrombophlebitis, Systemic or pulmonic emboli, Cardiac perforation (tamponade), Infection, Pneumothorax, Arrhythmias, Proarrhythmic effects of drugs, Radiation exposure, Catheter entrapment  <1 % Vascular injury including perforation of vein, artery or heart  1-2% Tamponade and Aortic puncture with left sided transeptal approach  1% CVA   <1% MI  <0.1% death  If external defibrillation or CV is needed, 25% risk for superficial burn  Risks associated with general anesthesia will be addressed by the Anesthesiology Department  Radiofrequency Risks:  In addition to standard risks for Radiofrequency Ablation, there is:  <2% Significant pulmonary vein stenosis  <2% Embolic events  <1% Esophageal fistula  <1% Phrenic nerve paralysis    Cryoablation Risks:  In addition to standard risks for Cryoablation Ablation, there is:  <1% Phrenic nerve paralysis  <1% Pulmonary vein stenosis  <1% Esophageal fistula  Medication:   Instructions regarding anticoagulants: Xarelto- Continue anticoagulation uninterrupted through their procedure, do not miss any doses of AC prior to procedure, importance of taking AC for stroke prevention, taking AC as prescribed, to call prior to PVI if missed a dose of AC, and if upon arrival pt reports missing a dose of AC PVI will potentially be cnx/postponed  Instructions given to pt regarding antiarrhythmic medication:  Diltiazem - hold 3 days prior to procedure, and will be started on 5/20/24  Instructions given to pt regarding PPI medication: Start Protonix 40mg Daily 3 days prior, 6wk post  Instructions given to pt regarding diuretics medication: Hold oral diuretics AM of procedure  Instructions given to pt regarding DM/GLP-1 medication:   DM- Hold all oral diabetic medications and short acting insulin the morning of the procedure, and take 1/2 of pts scheduled doses of long acting insulin the PM prior and/or AM of procedure  GLP-1- None  Instructions for medication, other than anticoagulants and antiarrhythmics listed above, given to pt: Take all medication AM of procedure with small sips of water     Important patient information for staff: None    5/15/2024 9:50 AM  Tammi Mercado RN

## 2024-05-15 NOTE — LETTER
5/15/2024    Angélica Dc PA-C  [address]  [facility] [address]    RE: Cayetano Palmer       Dear Colleague,     I had the pleasure of seeing Cayetano Palmer in the [facility].       [facility]  Cardiac Electrophysiology  [address]  [place] [address]   Office: [phone]  Fax: [phone]     HEART CARE ELECTROPHYSIOLOGY NOTE     Primary Care: Angélica Dc MD       Assessment/Recommendations     Persistent atrial fibrillation/stage 3B: We discussed pulmonary vein isolation ablation procedure including <1-2% risk for major complication, anticipated success rates, recovery and follow-up.  Medical and surgical history reviewed and updated. Current medications and allergies reviewed and updated as appropriate. No personal or family history of adverse reactions to anesthesia or abnormal bleeding with surgery.    [id number] score of 4-CAD, HTN, DM, cardiomyopathy.  He has upcoming dental procedure, advised to continue OAC uninterrupted for 3 months following ablation    HFrEF: Presumably tachycardia mediated with recently negative MPI. Compensated without overt hypervolemia. On ARB, HCTZ    HTN: Controlled on current regimen    RADHA: Consistent use of CPAP    Plan:   -Continue Xarelto 20 mg daily with dinner for stroke prophylaxis  -Hold diltiazem beginning 3 days prior to ablation  -Start Protonix 40mg every day beginning 3 days prior to ablation and continuing for 6 weeks thereafter  -EP follow-up 6 weeks post ablation     History of Present Illness/Subjective    Cayetano Palmer is a 53 year old male with past medical history significant for persistent AF, HFrEF, CAD, HTN, non-insulin-dependent type 2 diabetes, obesity, RADHA, nicotine use, seen today for history and physical prior to atrial fibrillation ablation.  He was first documented in atrial fibrillation 3/2024, after ongoing fatigue and dyspnea following treatment for  pneumonia.  TTE was significant for LVEF 45-50%, mild LAE.  He notes his activity tolerance has been poor recently, not able to do as much yard work without rest.  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 He denies chest discomfort, palpitations, shortness of breath, lightheadedness/dizziness, pedal edema, or syncope.     Data Review     Arrhythmia hx:   Dx/date: Persistent AF 3/2024  Sx: Fatigue, dyspnea, lightheadedness, tachypalpitations  [id number], OAC: 4-CAD, HTN, DM, cardiomyopathy; rivaroxaban  Rate control: Diltiazem  AAD: None  DCCV: None  Ablation: None    EK/15/2024  bpm  Personally reviewed.     TTE 2024:   The left ventricle is normal in size. There is moderate concentric left  ventricular hypertrophy.  Difficult to accurately estimate LV systolic due to irregular filling cycles,  however grossly the the LVEF appears mildly reduced in the 45-50% range. There  is diffuse hypokinesis of the left ventricle.  The right ventricle is normal size. Right ventricular function cannot be  assessed due to poor image quality.  The left atrium is mildly dilated. Right atrium not well visualized.  IVC diameter <2.1 cm collapsing >50% with sniff suggests a normal RA pressure  of 3 mmHg.  Right ventricle systolic pressure estimate normal  There is no comparison study available.    Coronary Angiogram 2021  PRESENTATION / INDICATIONS  * CP, elevated troponin  VASCULAR ACCESS  * The radial artery was too small for vascular access.  * Using ultrasound guidance and a percutaneous technique, the right common femoral artery was accessed. Ultrasound was used to confirm vessel patency, localizing needle into the lumen of the vessel. An image was saved for the medical record.  DIAGNOSTIC - CORONARY  * Right dominant coronary artery system and large caliber vessels with mild coronary calcification.  * The left main artery has 20% eccentric stenosis in the mid portion.  * The  "LAD has minimal disease. Diagonals are moderate caliber with minimal disease.  * The circumflex artery has minimal disease proximally. OM1 is smaller with 20% ostial stenosis. OM2 is large and normal.  * The RCA is very large with 60% proximal stenosis and 30% mid stenosis. The remainder of the vessel has minimal disease. Baseline FFR measured in the RCA was 1.0 and hyperemic (IV adenosine infusion) FFR was 0.93 indicating the disease is not hemodynamically significant.       I have reviewed and updated the patient's past medical history, allergies and medication list.               Physical Examination Review of Systems   /68 (BP Location: Right arm, Patient Position: Sitting, Cuff Size: Adult Large)   Pulse 112   Resp 16   Ht 1.753 m (5' 9\")   Wt 132.9 kg (293 lb)   BMI 43.27 kg/m      Body mass index is 43.27 kg/m .    Wt Readings from Last 3 Encounters:   05/15/24 132.9 kg (293 lb)   04/22/24 133.8 kg (295 lb)   03/26/24 132.1 kg (291 lb 3.2 oz)     General   Appearance:   Alert and oriented, in no acute distress.    HEENT:  Normocephalic and atraumatic. Conjunctiva and sclera are clear. Moist oral mucosa.    Neck: No JVP, carotid bruit or obvious thyromegaly.   Lungs:   Respirations unlabored. Clear bilaterally with no rales, rhonchi, or wheezes.     Cardiovascular:   Rhythm is irregular. S1 and S2 are normal. No significant murmur is present. Lower extremities demonstrate 1+ edema. Posterior tibial pulses are intact bilaterally.   Extremities: No cyanosis or clubbing   Skin: Skin is warm, dry, and otherwise intact.   Neurologic: Gait not asssessed. Mood and affect appropriate.                                                Medical History  Surgical History Family History Social History   No past medical history on file. No past surgical history on file. No family history on file. Social History     Tobacco Use    Smoking status: Former     Current packs/day: 0.00     Types: Cigarettes     Quit date: " "3/1/2024     Years since quittin.2    Smokeless tobacco: Never          Medications  Allergies   Current Outpatient Medications   Medication Sig Dispense Refill    diltiazem ER (DILT-XR) 240 MG 24 hr ER beaded capsule Take 1 capsule (240 mg) by mouth daily 90 capsule 2    hydrochlorothiazide (HYDRODIURIL) 25 MG tablet Take 1 tablet (25 mg) by mouth daily 90 tablet 3    losartan (COZAAR) 100 MG tablet Take 1 tablet (100 mg) by mouth daily 90 tablet 3    metFORMIN (GLUCOPHAGE) 500 MG tablet TAKE 1 TABLET BY MOUTH TWICE A DAY WITH MEALS 180 tablet 2    rivaroxaban ANTICOAGULANT (XARELTO) 20 MG TABS tablet Take 1 tablet (20 mg) by mouth daily (with dinner) 30 tablet 3    rosuvastatin (CRESTOR) 20 MG tablet Take 1 tablet (20 mg) by mouth daily 90 tablet 3    EPINEPHrine (ANY BX GENERIC EQUIV) 0.3 MG/0.3ML injection 2-pack Inject 0.3 mg into the muscle (Patient not taking: Reported on 5/15/2024)      pantoprazole (PROTONIX) 40 MG EC tablet Take 1 tablet (40 mg) by mouth daily 45 tablet 0    Allergies   Allergen Reactions    Metoprolol Itching     Itching started <1h after taking this med 2x         Lab Results    Chemistry/lipid CBC Cardiac Enzymes/BNP/TSH/INR   Lab Results   Component Value Date    BUN 16.6 05/15/2024     05/15/2024    CO2 25 05/15/2024     No results found for: \"CREATININE\"    No results found for: \"CHOL\", \"HDL\", \"LDL\", \"CHOLHDL\"   Lab Results   Component Value Date    WBC 7.3 05/15/2024    HGB 16.1 05/15/2024    HCT 46.1 05/15/2024    MCV 88 05/15/2024     05/15/2024    Lab Results   Component Value Date    INR 1.10 2024        26 minutes spent reviewing prior records (including documentation, laboratory studies, cardiac testing/imaging), history and physical exam, planning, and subsequent documentation.     The longitudinal plan of care for the diagnosis(es)/condition(s) as documented were addressed during this visit. Due to the added complexity in care, I will continue to " support Cayetano in the subsequent management and with ongoing continuity of care.     This note has been dictated using voice recognition software. Any grammatical, typographical, or context distortions are unintentional and inherent to the software.    Priti Horne CNP  Clinical Cardiac Electrophysiology  [facility]  Clinic and [phone]  Fax: [phone]  Electrophysiology Nurses: [phone]                                     Thank you for allowing me to participate in the care of your patient.      Sincerely,     DEONTE MULLINS CNP     [facility]  cc:   DEONTE Pearl CNP  [facility] [address]  [address] [place] [address]

## 2024-05-22 ENCOUNTER — ANESTHESIA EVENT (OUTPATIENT)
Dept: CARDIOLOGY | Facility: HOSPITAL | Age: 54
End: 2024-05-22
Payer: COMMERCIAL

## 2024-05-23 ENCOUNTER — ANESTHESIA (OUTPATIENT)
Dept: CARDIOLOGY | Facility: HOSPITAL | Age: 54
End: 2024-05-23
Payer: COMMERCIAL

## 2024-05-23 ENCOUNTER — TELEPHONE (OUTPATIENT)
Dept: CARDIOLOGY | Facility: CLINIC | Age: 54
End: 2024-05-23

## 2024-05-23 ENCOUNTER — HOSPITAL ENCOUNTER (OUTPATIENT)
Facility: HOSPITAL | Age: 54
Discharge: HOME | End: 2024-05-23
Attending: INTERNAL MEDICINE | Admitting: INTERNAL MEDICINE
Payer: COMMERCIAL

## 2024-05-23 VITALS
WEIGHT: 293 LBS | RESPIRATION RATE: 12 BRPM | SYSTOLIC BLOOD PRESSURE: 112 MMHG | HEART RATE: 87 BPM | TEMPERATURE: 98.3 F | OXYGEN SATURATION: 97 % | DIASTOLIC BLOOD PRESSURE: 79 MMHG | BODY MASS INDEX: 43.4 KG/M2 | HEIGHT: 69 IN

## 2024-05-23 DIAGNOSIS — I48.19 PERSISTENT ATRIAL FIBRILLATION (H): Primary | ICD-10-CM

## 2024-05-23 DIAGNOSIS — I48.0 PAROXYSMAL ATRIAL FIBRILLATION (H): ICD-10-CM

## 2024-05-23 LAB
ACT BLD: 313 SECONDS (ref 74–150)
ACT BLD: 387 SECONDS (ref 74–150)
ANION GAP SERPL CALCULATED.3IONS-SCNC: 10 MMOL/L (ref 7–15)
BUN SERPL-MCNC: 16.2 MG/DL (ref 6–20)
CALCIUM SERPL-MCNC: 9.8 MG/DL (ref 8.6–10)
CHLORIDE SERPL-SCNC: 103 MMOL/L (ref 98–107)
CREAT SERPL-MCNC: 1.03 MG/DL (ref 0.67–1.17)
DEPRECATED HCO3 PLAS-SCNC: 28 MMOL/L (ref 22–29)
EGFRCR SERPLBLD CKD-EPI 2021: 87 ML/MIN/1.73M2
ERYTHROCYTE [DISTWIDTH] IN BLOOD BY AUTOMATED COUNT: 12.4 % (ref 10–15)
GLUCOSE BLDC GLUCOMTR-MCNC: 198 MG/DL (ref 70–99)
GLUCOSE SERPL-MCNC: 219 MG/DL (ref 70–99)
HCT VFR BLD AUTO: 47 % (ref 40–53)
HGB BLD-MCNC: 16.2 G/DL (ref 13.3–17.7)
MCH RBC QN AUTO: 30.5 PG (ref 26.5–33)
MCHC RBC AUTO-ENTMCNC: 34.5 G/DL (ref 31.5–36.5)
MCV RBC AUTO: 89 FL (ref 78–100)
PLATELET # BLD AUTO: 205 10E3/UL (ref 150–450)
POTASSIUM SERPL-SCNC: 3.7 MMOL/L (ref 3.4–5.3)
RBC # BLD AUTO: 5.31 10E6/UL (ref 4.4–5.9)
SODIUM SERPL-SCNC: 141 MMOL/L (ref 135–145)
WBC # BLD AUTO: 10.3 10E3/UL (ref 4–11)

## 2024-05-23 PROCEDURE — 93656 COMPRE EP EVAL ABLTJ ATR FIB: CPT | Performed by: INTERNAL MEDICINE

## 2024-05-23 PROCEDURE — C1733 CATH, EP, OTHR THAN COOL-TIP: HCPCS | Performed by: INTERNAL MEDICINE

## 2024-05-23 PROCEDURE — 93615 ESOPHAGEAL RECORDING: CPT | Performed by: INTERNAL MEDICINE

## 2024-05-23 PROCEDURE — 93010 ELECTROCARDIOGRAM REPORT: CPT | Performed by: GENERAL ACUTE CARE HOSPITAL

## 2024-05-23 PROCEDURE — 999N000054 HC STATISTIC EKG NON-CHARGEABLE

## 2024-05-23 PROCEDURE — C1759 CATH, INTRA ECHOCARDIOGRAPHY: HCPCS | Performed by: INTERNAL MEDICINE

## 2024-05-23 PROCEDURE — C1766 INTRO/SHEATH,STRBLE,NON-PEEL: HCPCS | Performed by: INTERNAL MEDICINE

## 2024-05-23 PROCEDURE — 93005 ELECTROCARDIOGRAM TRACING: CPT

## 2024-05-23 PROCEDURE — 258N000003 HC RX IP 258 OP 636

## 2024-05-23 PROCEDURE — C1887 CATHETER, GUIDING: HCPCS | Performed by: INTERNAL MEDICINE

## 2024-05-23 PROCEDURE — 85014 HEMATOCRIT: CPT | Performed by: INTERNAL MEDICINE

## 2024-05-23 PROCEDURE — 93615 ESOPHAGEAL RECORDING: CPT | Mod: 26 | Performed by: INTERNAL MEDICINE

## 2024-05-23 PROCEDURE — 36415 COLL VENOUS BLD VENIPUNCTURE: CPT | Performed by: INTERNAL MEDICINE

## 2024-05-23 PROCEDURE — 250N000009 HC RX 250: Performed by: INTERNAL MEDICINE

## 2024-05-23 PROCEDURE — 272N000001 HC OR GENERAL SUPPLY STERILE: Performed by: INTERNAL MEDICINE

## 2024-05-23 PROCEDURE — C1732 CATH, EP, DIAG/ABL, 3D/VECT: HCPCS | Performed by: INTERNAL MEDICINE

## 2024-05-23 PROCEDURE — 80048 BASIC METABOLIC PNL TOTAL CA: CPT | Performed by: INTERNAL MEDICINE

## 2024-05-23 PROCEDURE — 250N000009 HC RX 250

## 2024-05-23 PROCEDURE — 85347 COAGULATION TIME ACTIVATED: CPT

## 2024-05-23 PROCEDURE — 370N000017 HC ANESTHESIA TECHNICAL FEE, PER MIN: Performed by: INTERNAL MEDICINE

## 2024-05-23 PROCEDURE — C1894 INTRO/SHEATH, NON-LASER: HCPCS | Performed by: INTERNAL MEDICINE

## 2024-05-23 PROCEDURE — 250N000011 HC RX IP 250 OP 636

## 2024-05-23 PROCEDURE — 250N000011 HC RX IP 250 OP 636: Performed by: INTERNAL MEDICINE

## 2024-05-23 PROCEDURE — C1730 CATH, EP, 19 OR FEW ELECT: HCPCS | Performed by: INTERNAL MEDICINE

## 2024-05-23 PROCEDURE — 82962 GLUCOSE BLOOD TEST: CPT

## 2024-05-23 PROCEDURE — 258N000003 HC RX IP 258 OP 636: Performed by: INTERNAL MEDICINE

## 2024-05-23 PROCEDURE — 710N000010 HC RECOVERY PHASE 1, LEVEL 2, PER MIN

## 2024-05-23 RX ORDER — HEPARIN SODIUM 10000 [USP'U]/100ML
INJECTION, SOLUTION INTRAVENOUS CONTINUOUS PRN
Status: DISCONTINUED | OUTPATIENT
Start: 2024-05-23 | End: 2024-05-23 | Stop reason: HOSPADM

## 2024-05-23 RX ORDER — LIDOCAINE 40 MG/G
CREAM TOPICAL
Status: DISCONTINUED | OUTPATIENT
Start: 2024-05-23 | End: 2024-05-23 | Stop reason: HOSPADM

## 2024-05-23 RX ORDER — ESMOLOL HYDROCHLORIDE 10 MG/ML
INJECTION INTRAVENOUS PRN
Status: DISCONTINUED | OUTPATIENT
Start: 2024-05-23 | End: 2024-05-23

## 2024-05-23 RX ORDER — PROTAMINE SULFATE 10 MG/ML
INJECTION, SOLUTION INTRAVENOUS PRN
Status: DISCONTINUED | OUTPATIENT
Start: 2024-05-23 | End: 2024-05-23

## 2024-05-23 RX ORDER — ONDANSETRON 4 MG/1
4 TABLET, ORALLY DISINTEGRATING ORAL EVERY 6 HOURS PRN
Status: DISCONTINUED | OUTPATIENT
Start: 2024-05-23 | End: 2024-05-23 | Stop reason: HOSPADM

## 2024-05-23 RX ORDER — IBUPROFEN 600 MG/1
600 TABLET, FILM COATED ORAL EVERY 6 HOURS PRN
Status: DISCONTINUED | OUTPATIENT
Start: 2024-05-23 | End: 2024-05-23 | Stop reason: HOSPADM

## 2024-05-23 RX ORDER — PROPOFOL 10 MG/ML
INJECTION, EMULSION INTRAVENOUS PRN
Status: DISCONTINUED | OUTPATIENT
Start: 2024-05-23 | End: 2024-05-23

## 2024-05-23 RX ORDER — SODIUM CHLORIDE, SODIUM LACTATE, POTASSIUM CHLORIDE, CALCIUM CHLORIDE 600; 310; 30; 20 MG/100ML; MG/100ML; MG/100ML; MG/100ML
INJECTION, SOLUTION INTRAVENOUS CONTINUOUS
Status: DISCONTINUED | OUTPATIENT
Start: 2024-05-23 | End: 2024-05-23 | Stop reason: HOSPADM

## 2024-05-23 RX ORDER — ONDANSETRON 2 MG/ML
4 INJECTION INTRAMUSCULAR; INTRAVENOUS EVERY 6 HOURS PRN
Status: DISCONTINUED | OUTPATIENT
Start: 2024-05-23 | End: 2024-05-23 | Stop reason: HOSPADM

## 2024-05-23 RX ORDER — SODIUM CHLORIDE 9 MG/ML
100 INJECTION, SOLUTION INTRAVENOUS CONTINUOUS
Status: DISCONTINUED | OUTPATIENT
Start: 2024-05-23 | End: 2024-05-23 | Stop reason: HOSPADM

## 2024-05-23 RX ORDER — PROPOFOL 10 MG/ML
INJECTION, EMULSION INTRAVENOUS CONTINUOUS PRN
Status: DISCONTINUED | OUTPATIENT
Start: 2024-05-23 | End: 2024-05-23

## 2024-05-23 RX ORDER — KETAMINE HYDROCHLORIDE 10 MG/ML
INJECTION INTRAMUSCULAR; INTRAVENOUS PRN
Status: DISCONTINUED | OUTPATIENT
Start: 2024-05-23 | End: 2024-05-23

## 2024-05-23 RX ORDER — HEPARIN SODIUM 1000 [USP'U]/ML
INJECTION, SOLUTION INTRAVENOUS; SUBCUTANEOUS
Status: DISCONTINUED | OUTPATIENT
Start: 2024-05-23 | End: 2024-05-23 | Stop reason: HOSPADM

## 2024-05-23 RX ORDER — ACETAMINOPHEN 325 MG/1
650 TABLET ORAL EVERY 4 HOURS PRN
Status: DISCONTINUED | OUTPATIENT
Start: 2024-05-23 | End: 2024-05-23 | Stop reason: HOSPADM

## 2024-05-23 RX ORDER — DEXAMETHASONE SODIUM PHOSPHATE 10 MG/ML
INJECTION, SOLUTION INTRAMUSCULAR; INTRAVENOUS PRN
Status: DISCONTINUED | OUTPATIENT
Start: 2024-05-23 | End: 2024-05-23

## 2024-05-23 RX ORDER — FENTANYL CITRATE 50 UG/ML
25 INJECTION, SOLUTION INTRAMUSCULAR; INTRAVENOUS
Status: DISCONTINUED | OUTPATIENT
Start: 2024-05-23 | End: 2024-05-23 | Stop reason: HOSPADM

## 2024-05-23 RX ORDER — ONDANSETRON 2 MG/ML
INJECTION INTRAMUSCULAR; INTRAVENOUS PRN
Status: DISCONTINUED | OUTPATIENT
Start: 2024-05-23 | End: 2024-05-23

## 2024-05-23 RX ORDER — LIDOCAINE HYDROCHLORIDE AND EPINEPHRINE 10; 10 MG/ML; UG/ML
INJECTION, SOLUTION INFILTRATION; PERINEURAL
Status: DISCONTINUED | OUTPATIENT
Start: 2024-05-23 | End: 2024-05-23 | Stop reason: HOSPADM

## 2024-05-23 RX ADMIN — PHENYLEPHRINE HYDROCHLORIDE 150 MCG: 10 INJECTION INTRAVENOUS at 08:24

## 2024-05-23 RX ADMIN — SUGAMMADEX 200 MG: 100 INJECTION, SOLUTION INTRAVENOUS at 08:35

## 2024-05-23 RX ADMIN — MIDAZOLAM 2 MG: 1 INJECTION INTRAMUSCULAR; INTRAVENOUS at 06:55

## 2024-05-23 RX ADMIN — SODIUM CHLORIDE: 9 INJECTION, SOLUTION INTRAVENOUS at 06:55

## 2024-05-23 RX ADMIN — ROCURONIUM BROMIDE 70 MG: 50 INJECTION, SOLUTION INTRAVENOUS at 07:05

## 2024-05-23 RX ADMIN — PHENYLEPHRINE HYDROCHLORIDE 200 MCG: 10 INJECTION INTRAVENOUS at 07:11

## 2024-05-23 RX ADMIN — PHENYLEPHRINE HYDROCHLORIDE 200 MCG: 10 INJECTION INTRAVENOUS at 07:25

## 2024-05-23 RX ADMIN — KETAMINE HYDROCHLORIDE 50 MG: 10 INJECTION INTRAMUSCULAR; INTRAVENOUS at 07:04

## 2024-05-23 RX ADMIN — ROCURONIUM BROMIDE 30 MG: 50 INJECTION, SOLUTION INTRAVENOUS at 07:21

## 2024-05-23 RX ADMIN — ESMOLOL HYDROCHLORIDE 30 MG: 10 INJECTION, SOLUTION INTRAVENOUS at 07:11

## 2024-05-23 RX ADMIN — DEXAMETHASONE SODIUM PHOSPHATE 4 MG: 10 INJECTION, SOLUTION INTRAMUSCULAR; INTRAVENOUS at 07:22

## 2024-05-23 RX ADMIN — SODIUM CHLORIDE: 9 INJECTION, SOLUTION INTRAVENOUS at 08:14

## 2024-05-23 RX ADMIN — PHENYLEPHRINE HYDROCHLORIDE 100 MCG: 10 INJECTION INTRAVENOUS at 07:28

## 2024-05-23 RX ADMIN — ONDANSETRON 4 MG: 2 INJECTION INTRAMUSCULAR; INTRAVENOUS at 08:33

## 2024-05-23 RX ADMIN — PHENYLEPHRINE HYDROCHLORIDE 0.3 MCG/KG/MIN: 10 INJECTION INTRAVENOUS at 07:11

## 2024-05-23 RX ADMIN — PROPOFOL 200 MCG/KG/MIN: 10 INJECTION, EMULSION INTRAVENOUS at 07:04

## 2024-05-23 RX ADMIN — PROPOFOL 200 MG: 10 INJECTION, EMULSION INTRAVENOUS at 07:04

## 2024-05-23 RX ADMIN — PROTAMINE SULFATE 50 MG: 10 INJECTION, SOLUTION INTRAVENOUS at 08:27

## 2024-05-23 RX ADMIN — ROCURONIUM BROMIDE 25 MG: 50 INJECTION, SOLUTION INTRAVENOUS at 08:06

## 2024-05-23 RX ADMIN — ROCURONIUM BROMIDE 25 MG: 50 INJECTION, SOLUTION INTRAVENOUS at 07:41

## 2024-05-23 ASSESSMENT — ACTIVITIES OF DAILY LIVING (ADL)
ADLS_ACUITY_SCORE: 35

## 2024-05-23 NOTE — ANESTHESIA PROCEDURE NOTES
Airway       Patient location during procedure: OR       Procedure Start/Stop Times: 5/23/2024 7:08 AM  Staff -        CRNA: Greg Rodriguez APRN CRNA       Performed By: CRNA  Consent for Airway        Urgency: elective  Indications and Patient Condition       Indications for airway management: aydin-procedural       Induction type:intravenous       Mask difficulty assessment: 2 - vent by mask + OA or adjuvant +/- NMBA    Final Airway Details       Final airway type: endotracheal airway       Successful airway: ETT - single and Oral  Endotracheal Airway Details        ETT size (mm): 7.5       Cuffed: yes       Successful intubation technique: video laryngoscopy       VL Blade Size: Glidescope 4       Grade View of Cords: 1       Adjucts: stylet       Position: Left       Measured from: lips       Secured at (cm): 25       Bite block used: None    Post intubation assessment        Placement verified by: capnometry, equal breath sounds and chest rise        Number of attempts at approach: 1       Number of other approaches attempted: 0       Secured with: silk tape       Ease of procedure: easy       Dentition: Intact and Unchanged    Medication(s) Administered   Medication Administration Time: 5/23/2024 7:08 AM

## 2024-05-23 NOTE — ANESTHESIA CARE TRANSFER NOTE
Patient: Cayetano Palmer    Procedure: Procedure(s):  Ablation Atrial Fibrillation       Diagnosis: Atrial Fibrillation  Diagnosis Additional Information: No value filed.    Anesthesia Type:   General     Note:    Oropharynx: spontaneously breathing and oral airway in place  Level of Consciousness: drowsy  Oxygen Supplementation: face mask  Level of Supplemental Oxygen (L/min / FiO2): 8  Independent Airway: airway patency satisfactory and stable  Dentition: dentition unchanged  Vital Signs Stable: post-procedure vital signs reviewed and stable  Report to RN Given: handoff report given  Patient transferred to: Cardiac Special Care        Vitals:  Vitals Value Taken Time   /67 05/23/24 0851   Temp 36.4  C (97.5  F) 05/23/24 0851   Pulse 87 05/23/24 0856   Resp 26 05/23/24 0856   SpO2 91 % 05/23/24 0856   Vitals shown include unfiled device data.    Electronically Signed By: DEONTE Treviño CRNA  May 23, 2024  8:57 AM

## 2024-05-23 NOTE — PROGRESS NOTES
Patient was kept comfortable during post-procedure stay.VSS. Denies pain. Right femoral access site remains dry & free from signs of bleeding. Tolerated food and fluids. Ambulated without issues. Appointments made & included in AVS. Dr. Jones was able to speak with patient post procedure. Post-op instructions reviewed and packet given to patient & spouse. Able to ask questions. Verbalized no concerns. Belongings returned. Discharged in stable condition.

## 2024-05-23 NOTE — INTERVAL H&P NOTE
"I have reviewed the surgical (or preoperative) H&P that is linked to this encounter, and examined the patient. There are no significant changes    Clinical Conditions Present on Arrival:  Clinically Significant Risk Factors Present on Admission                # Drug Induced Coagulation Defect: home medication list includes an anticoagulant medication   # DMII: A1C = 9.9 % (Ref range: <5.7 %) within past 6 months  # Severe Obesity: Estimated body mass index is 43.27 kg/m  as calculated from the following:    Height as of 5/15/24: 1.753 m (5' 9\").    Weight as of 5/15/24: 132.9 kg (293 lb).       "

## 2024-05-23 NOTE — TELEPHONE ENCOUNTER
Received FMLA and work restrictions, this was dropped off at clinic 5/22  Pt had PVI with Dr Jones on 5/23  1st Attempt to contact pt, voicemail message was left with contact information and instructing pt to call back.  In review of pts chart, pt is a surgical tech  Will need to discuss return back to work date, 1 wk would put to pt back to work wthout restrictions on 5/31 5/23/2024 2:05 PM  Radha Pereira RN

## 2024-05-23 NOTE — Clinical Note
Arrhythmia Type: atrial fibrillation.   Method of Cardioversion: synchronous.   The arrhythmia was terminated.   Post cardioversion rhythm: sinus rhythm.   No early return to atrial fibrillation (ERAF). No immediate return to atrial fibrillation (IRAF).

## 2024-05-23 NOTE — PROGRESS NOTES
Patient prepped and consented for Afib ablation today. Denies pain, VSS and able to make needs known. Cath lab team here and report given.  Gricelda Deleon RN

## 2024-05-23 NOTE — Clinical Note
Arrhythmia Type: atrial fibrillation.   Method of Cardioversion: synchronous.   The arrhythmia was terminated.   Energy shock delivered: 250 joules.   Post cardioversion rhythm: sinus rhythm.   Early return to atrial fibrillation (ERAF).   No immediate return to atrial fibrillation (IRAF).

## 2024-05-23 NOTE — DISCHARGE INSTRUCTIONS
[facility]  Cardiac Electrophysiology  [address]  [place] [address]   Office: [phone]  Fax: [phone]     Cardiac Electrophysiology - Post Ablation Discharge Instructions      PROCEDURE   Atrial fibrillation ablation         MEDICATION INSTRUCTIONS   Continue taking all home meds  Continue taking your blood thinner .          DISCHARGE INSTRUCTIONS   General instructions  Have an adult stay with you until tomorrow.  You may resume your normal diet.    You may shower tomorrow.  Do NOT take a bath, or use a hot tub or pool for at least 1 week. Do not scrub the site. Do not use lotion or powder near the puncture site.    Groin care instructions  For the first 24 hrs - check the puncture site every 1-2 hours while awake.  You may keep a bandaid over the puncture sites for 1 or 2 days post-procedure and thereafter may keep these sites uncovered.  Change the bandaid daily.  If there is minor oozing, apply another bandaid and remove it after 12 hours.  For 2 days, when you cough, sneeze, laugh or move your bowels, hold your hand over the puncture site and press firmly.  Mild bruising at the access sites is normal.  If you notice increased swelling, external bleeding, or have other concerns regarding your access sites please consider emergency department evaluation and call your electrophysiology team's office    Activity recommendations  Do not drive for 3 days.  Avoid stooping or squatting more than 90 degrees at the hips for 7 days  Avoid repetitive motions such as loading , vacuuming, raking or shoveling  Avoid heavy lifting (greater than 25lbs) for 1 week    Post ablation instructions  You may have some irregular heartbeats following your ablation.  These may feel very strong and feel like atrial fibrillation re-initiation is imminent - these episodes should occur less frequently over time.  Recurrent atrial fibrillation can occur within the first 3 months  post ablation while your heart recovers from the procedure.  Pleuritic chest discomfort (chest pain worse with taking deep breaths, worse with laying flat on your back) can occur after ablation, usually coming about within the first 24-48hrs post ablation.  If this occurs and is severe enough to be troublesome to you, please call us and consider starting a course of ibuprofen 400mg three times daily for 5 to 7 days    Things to watch for  As with any type of procedure, please be more attentive to unusual symptoms post ablation (eg. fever, neurologic changes, pain with swallowing, loss of consciousness, etc) - we recommend ER evaluation for any such symptoms in the first few weeks post procedure.    Consider ER evaluation for the following:  Severe chest pain not relieved by Tylenol or Ibuprofen  You have chills or a fever greater than 101 F (38 C)  Neurologic changes (eg. leg, arm or face weakness or numbness, difficulties with speech or word finding, problems walking or with your balance, vision changes)  Severe difficulty swallowing and/or you are coughing up blood  Shortness of breath  Increased groin pain or a large or growing hard lump around the site  Groin is red, swollen, hot or tender  Blood or fluid is draining from the groin site  Any numbness, coolness or changes in color in your extremities  Groin pain not relieved by Tylenol or Advil  Recurrent atrial fibrillation associated with sustained rapid heart rates or associated with additional concerning symptoms.    Our office will have a follow-up visit scheduled for you in approximately 6 weeks.  Please do not hesitate to call us before that time should issues arise.        [facility]    [phone]    If you are calling after hours, please listen to the entire voicemail,   a live  will answer at the end of the message.    [phone] to reach the EP nurses working with Dr Jones

## 2024-05-23 NOTE — ANESTHESIA POSTPROCEDURE EVALUATION
Patient: Cayetano Palmer    Procedure: Procedure(s):  Ablation Atrial Fibrillation       Anesthesia Type:  General    Note:  Disposition: Outpatient   Postop Pain Control: Uneventful            Sign Out: Well controlled pain   PONV: No   Neuro/Psych: Uneventful            Sign Out: Acceptable/Baseline neuro status   Airway/Respiratory: Uneventful            Sign Out: Acceptable/Baseline resp. status   CV/Hemodynamics: Uneventful            Sign Out: Acceptable CV status; No obvious hypovolemia; No obvious fluid overload   Other NRE: NONE   DID A NON-ROUTINE EVENT OCCUR? No           Last vitals:  Vitals Value Taken Time   /79 05/23/24 1230   Temp 36.8  C (98.3  F) 05/23/24 1230   Pulse 85 05/23/24 1153   Resp 20 05/23/24 1153   SpO2 97 % 05/23/24 1230   Vitals shown include unfiled device data.    Electronically Signed By: Latricia Em MD  May 23, 2024  2:50 PM

## 2024-05-23 NOTE — ANESTHESIA PREPROCEDURE EVALUATION
Anesthesia Pre-Procedure Evaluation    Patient: Cayetano Palmer   MRN: [id number] : 1970        Procedure : Procedure(s):  Ablation Atrial Fibrillation          History reviewed. No pertinent past medical history.   History reviewed. No pertinent surgical history.   Allergies   Allergen Reactions    Metoprolol Itching     Itching started <1h after taking this med 2x      Social History     Tobacco Use    Smoking status: Former     Current packs/day: 0.00     Types: Cigarettes     Quit date: 3/1/2024     Years since quittin.2    Smokeless tobacco: Never   Substance Use Topics    Alcohol use: Not on file      Wt Readings from Last 1 Encounters:   05/15/24 132.9 kg (293 lb)        Anesthesia Evaluation   Pt has had prior anesthetic.     History of anesthetic complications       ROS/MED HX  ENT/Pulmonary:     (+) sleep apnea,                                       Neurologic:       Cardiovascular:     (+)  hypertension- -  CAD -  - -                                      METS/Exercise Tolerance:     Hematologic:       Musculoskeletal:       GI/Hepatic:       Renal/Genitourinary:       Endo:     (+) type I DM,              Obesity,       Psychiatric/Substance Use:       Infectious Disease:       Malignancy:       Other:            Physical Exam    Airway        Mallampati: II    Neck ROM: full     Respiratory Devices and Support         Dental       (+) Minor Abnormalities - some fillings, tiny chips      Cardiovascular          Rhythm and rate: irregular     Pulmonary   pulmonary exam normal                OUTSIDE LABS:  CBC:   Lab Results   Component Value Date    WBC 10.3 2024    WBC 7.3 05/15/2024    HGB 16.2 2024    HGB 16.1 05/15/2024    HCT 47.0 2024    HCT 46.1 05/15/2024     2024     05/15/2024     BMP:   Lab Results   Component Value Date     2024     05/15/2024    POTASSIUM 3.7 2024    POTASSIUM 3.7 05/15/2024    CHLORIDE 103 2024     "CHLORIDE 102 05/15/2024    CO2 28 05/23/2024    CO2 25 05/15/2024    BUN 16.2 05/23/2024    BUN 16.6 05/15/2024    CR 1.03 05/23/2024    CR 0.90 05/15/2024     (H) 05/23/2024     (H) 05/15/2024     COAGS:   Lab Results   Component Value Date    PTT 31 03/14/2024    INR 1.10 03/14/2024     POC: No results found for: \"BGM\", \"HCG\", \"HCGS\"  HEPATIC:   Lab Results   Component Value Date    ALBUMIN 3.8 03/14/2024    PROTTOTAL 7.2 03/14/2024    ALT 43 03/14/2024    AST 37 03/14/2024    ALKPHOS 75 03/14/2024    BILITOTAL 0.4 03/14/2024     OTHER:   Lab Results   Component Value Date    LACT 1.5 03/05/2024    A1C 9.9 (H) 03/05/2024    SHEELA 9.8 05/23/2024    MAG 1.9 03/14/2024       Anesthesia Plan    ASA Status:  3       Anesthesia Type: General.     - Airway: ETT              Consents    Anesthesia Plan(s) and associated risks, benefits, and realistic alternatives discussed. Questions answered and patient/representative(s) expressed understanding.     - Discussed: Risks, Benefits and Alternatives for the PROCEDURE were discussed     - Discussed with:  Patient      - Extended Intubation/Ventilatory Support Discussed: No.      - Patient is DNR/DNI Status: No     Use of blood products discussed: No .     Postoperative Care    Pain management: Multi-modal analgesia.   PONV prophylaxis: Ondansetron (or other 5HT-3), Dexamethasone or Solumedrol     Comments:               Latricia Em MD    I have reviewed the pertinent notes and labs in the chart from the past 30 days and (re)examined the patient.  Any updates or changes from those notes are reflected in this note.            # Drug Induced Coagulation Defect: home medication list includes an anticoagulant medication   # DMII: A1C = 9.9 % (Ref range: <5.7 %) within past 6 months  # Severe Obesity: Estimated body mass index is 43.27 kg/m  as calculated from the following:    Height as of 5/15/24: 1.753 m (5' 9\").    Weight as of 5/15/24: 132.9 kg (293 lb).      "

## 2024-05-24 LAB
ATRIAL RATE - MUSE: 89 BPM
DIASTOLIC BLOOD PRESSURE - MUSE: NORMAL MMHG
INTERPRETATION ECG - MUSE: NORMAL
P AXIS - MUSE: 66 DEGREES
PR INTERVAL - MUSE: 226 MS
QRS DURATION - MUSE: 114 MS
QT - MUSE: 378 MS
QTC - MUSE: 459 MS
R AXIS - MUSE: -77 DEGREES
SYSTOLIC BLOOD PRESSURE - MUSE: NORMAL MMHG
T AXIS - MUSE: 93 DEGREES
VENTRICULAR RATE- MUSE: 89 BPM

## 2024-05-24 NOTE — TELEPHONE ENCOUNTER
PC back from pt  Pt understands restrictions s/p PVI  Will return to work without restrictions on 5/31  Pt verbalized understanding, has no further questions or concerns at this time, and has our contact information if needed.  5/24/2024 11:54 AM  Radha Pereira RN

## 2024-05-28 ENCOUNTER — VIRTUAL VISIT (OUTPATIENT)
Dept: CARDIOLOGY | Facility: CLINIC | Age: 54
End: 2024-05-28
Payer: COMMERCIAL

## 2024-05-28 ENCOUNTER — TRANSFERRED RECORDS (OUTPATIENT)
Dept: CARDIOLOGY | Facility: CLINIC | Age: 54
End: 2024-05-28

## 2024-05-28 DIAGNOSIS — I48.19 PERSISTENT ATRIAL FIBRILLATION (H): Primary | ICD-10-CM

## 2024-05-28 PROCEDURE — 99207 PR NO CHARGE NURSE ONLY: CPT | Mod: 93

## 2024-05-28 NOTE — PATIENT INSTRUCTIONS
Instructions Following your Ablation Procedure    Your anticoagulation medication Xarelto:  It is important to remain on your anticoagulation medication uninterrupted after your ablation to reduce your risk of a stroke or heart attack, do not stop this medication  Please contact me if you have any questions regarding your anticoagulation medication    Groin care instructions  Keep the site clean and dry, do not place a bandage over the site. If there is minor oozing, apply another bandaid and remove it after 12 hours.  Mild bruising at the access sites is normal. If you notice increased swelling, external bleeding, or have other concerns regarding your access sites please consider emergency department evaluation for significant changes and call your electrophysiology team's office.  You may experience mild discomfort at your groin sites, applying ice packs 20min 3-4 times a day can help alleviate this discomfort.    Activity recommendations  You can resume driving.  Avoid stooping or squatting more than 90 degrees at the hips, repetitive motions such as loading , vacuuming, raking or shoveling, and heavy lifting (greater than 25lbs) for 1 week  Increase your activity gradually over the next 5-10 days, working back to your normal daily activity/routine.    Post ablation instructions  Stay well hydrated, and increase your fluid intake during this recovery period  High protein foods aide in your bodies healing process  You may have some irregular heartbeats and/or atrial fibrillation following your ablation which is normal to recovery, these episodes should occur less frequently over time.   Recurrent atrial fibrillation can occur within the first 3 months post ablation while your heart recovers from the procedure. Please call the electrophysiology team's office if you have an episode lasting greater than 4 hours, or if you notice the episodes are increasing in frequency or duration  Pleuritic chest  discomfort (chest pain worse with taking deep breaths, worse with laying flat on your back) can occur after ablation, usually coming about within the first 24-48hrs post ablation. If this occurs and is severe enough to be troublesome to you, please call us and consider starting a course of ibuprofen 400mg three times daily for 5 to 7 days    Things to watch for  As with any type of procedure, please be more attentive to unusual symptoms post ablation (eg. fever, neurologic changes, pain with swallowing, loss of consciousness, etc) - we recommend ER evaluation for any such symptoms in the first few weeks post procedure.    Consider ER evaluation for the following:  Severe chest pain not relieved by Tylenol or Ibuprofen  You have chills or a fever greater than 101 F (38 C)  Neurologic changes (eg. leg, arm or face weakness or numbness, difficulties with speech or word finding, problems  walking or with your balance, vision changes)  Severe difficulty swallowing and/or you are coughing up blood  Shortness of breath  Increased groin pain or a large or growing hard lump around the site  Groin is red, swollen, hot or tender  Blood or fluid is draining from the groin site  Any numbness, coolness or changes in color in your extremities  Groin pain not relieved by Tylenol or Advil  Recurrent atrial fibrillation associated with sustained rapid heart rates or associated with additional concerning  symptoms.    Your follow up appointments are as follows:  You will be seen by the electrophysiologist nurse practitioner at 6 weeks after your ablation  At your 6 week appointment, a 3 month follow-up appointment will be arranged with either the Nurse Practitioner or the Electrophysiology provider     Sincerely,  Radha Barry RN [phone]    After hours please contact the on call service at # [phone]

## 2024-05-28 NOTE — PROGRESS NOTES
Post PVI Procedural Follow Up Call    Pt is s/p PVI from 5/23 with Dr Briscoe  PC was placed to pt, spoke to pt    General Assessment:     Weight: Pt reports pt is unable to report weight, but denies s/s of fluid retention    Pain: Pt denies generalized or localized pain abnormal to healing s/p     /GI: Pt denies difficulty swallowing, denies constipation, denies urinary retention/difficulty, reports no s/s of infection, report normal appetite, and reports staying hydrated.    Neurological: Pt Denies any neurological changes, or s/s of CVA    Respiratory: Pt denies SOB, denies difficulty breathing, denies throat pain, denies changes/abnormal sputum, and denies any further symptoms abnormal to normal healing process s/p PVI.    Activity: Pt is tolerating advancement in activity while following physical restrictions, staying well hydrated, and gradually working into baseline activity.     Rhythm Assessment:   Pt denies palpitations, denies irregularities in HR or rhythm, and denies symptoms or sustained AF episodes.    Procedure Site Assessment:   Pts some bruising around sites without significant change from hospital discharge and normal to PVI recovery    Anticoagulation/Medication:  Pt remain on Xarelto without interruption  Per guidelines by Dr Jones no ASA needed upon discharge    Education completed with pt at this visit:  Reviewed normal post-op PVI healing process, when to contact EP-RN/EP-MD, contact information was given to the pt for further concerns or questions, and pt verbalized understanding    Follow up  Pts AVS was printed and mailed to pt by scheduling team and pt will be seen by EP NP at 6 wks, monitor will be ordered at this follow-up if indicated as well as 3mo follow-up with either EP YARITZA or KONRAD SALEH    5/28/2024 11:15 AM  Radha Barry RN

## 2024-06-24 ENCOUNTER — OFFICE VISIT (OUTPATIENT)
Dept: FAMILY MEDICINE | Facility: CLINIC | Age: 54
End: 2024-06-24
Payer: COMMERCIAL

## 2024-06-24 VITALS
HEART RATE: 41 BPM | SYSTOLIC BLOOD PRESSURE: 142 MMHG | RESPIRATION RATE: 10 BRPM | OXYGEN SATURATION: 98 % | BODY MASS INDEX: 42.98 KG/M2 | DIASTOLIC BLOOD PRESSURE: 76 MMHG | WEIGHT: 290.2 LBS | TEMPERATURE: 97.8 F | HEIGHT: 69 IN

## 2024-06-24 DIAGNOSIS — E11.65 TYPE 2 DIABETES MELLITUS WITH HYPERGLYCEMIA, WITHOUT LONG-TERM CURRENT USE OF INSULIN (H): Primary | ICD-10-CM

## 2024-06-24 DIAGNOSIS — I48.91 ATRIAL FIBRILLATION, UNSPECIFIED TYPE (H): ICD-10-CM

## 2024-06-24 DIAGNOSIS — E66.01 MORBID OBESITY (H): ICD-10-CM

## 2024-06-24 DIAGNOSIS — I48.0 PAROXYSMAL ATRIAL FIBRILLATION (H): ICD-10-CM

## 2024-06-24 DIAGNOSIS — I42.9 SECONDARY CARDIOMYOPATHY (H): ICD-10-CM

## 2024-06-24 DIAGNOSIS — Z91.09 ALLERGY TO ENVIRONMENTAL FACTORS: ICD-10-CM

## 2024-06-24 DIAGNOSIS — I25.10 MILD CAD: ICD-10-CM

## 2024-06-24 LAB
ERYTHROCYTE [DISTWIDTH] IN BLOOD BY AUTOMATED COUNT: 12.2 % (ref 10–15)
HBA1C MFR BLD: 10.5 % (ref 0–5.6)
HCT VFR BLD AUTO: 42.3 % (ref 40–53)
HGB BLD-MCNC: 15 G/DL (ref 13.3–17.7)
MCH RBC QN AUTO: 30.7 PG (ref 26.5–33)
MCHC RBC AUTO-ENTMCNC: 35.5 G/DL (ref 31.5–36.5)
MCV RBC AUTO: 87 FL (ref 78–100)
PLATELET # BLD AUTO: 186 10E3/UL (ref 150–450)
RBC # BLD AUTO: 4.89 10E6/UL (ref 4.4–5.9)
WBC # BLD AUTO: 8 10E3/UL (ref 4–11)

## 2024-06-24 PROCEDURE — 80061 LIPID PANEL: CPT | Performed by: PHYSICIAN ASSISTANT

## 2024-06-24 PROCEDURE — 85027 COMPLETE CBC AUTOMATED: CPT | Performed by: PHYSICIAN ASSISTANT

## 2024-06-24 PROCEDURE — 99214 OFFICE O/P EST MOD 30 MIN: CPT | Mod: 25 | Performed by: PHYSICIAN ASSISTANT

## 2024-06-24 PROCEDURE — 90715 TDAP VACCINE 7 YRS/> IM: CPT | Performed by: PHYSICIAN ASSISTANT

## 2024-06-24 PROCEDURE — 36415 COLL VENOUS BLD VENIPUNCTURE: CPT | Performed by: PHYSICIAN ASSISTANT

## 2024-06-24 PROCEDURE — 83036 HEMOGLOBIN GLYCOSYLATED A1C: CPT | Performed by: PHYSICIAN ASSISTANT

## 2024-06-24 PROCEDURE — 82043 UR ALBUMIN QUANTITATIVE: CPT | Performed by: PHYSICIAN ASSISTANT

## 2024-06-24 PROCEDURE — 80048 BASIC METABOLIC PNL TOTAL CA: CPT | Performed by: PHYSICIAN ASSISTANT

## 2024-06-24 PROCEDURE — 82570 ASSAY OF URINE CREATININE: CPT | Performed by: PHYSICIAN ASSISTANT

## 2024-06-24 PROCEDURE — 90471 IMMUNIZATION ADMIN: CPT | Performed by: PHYSICIAN ASSISTANT

## 2024-06-24 RX ORDER — ACETAMINOPHEN 500 MG
500-1000 TABLET ORAL EVERY 6 HOURS PRN
COMMUNITY

## 2024-06-24 RX ORDER — EPINEPHRINE 0.3 MG/.3ML
0.3 INJECTION SUBCUTANEOUS PRN
Qty: 2 EACH | Refills: 2 | Status: SHIPPED | OUTPATIENT
Start: 2024-06-24

## 2024-06-24 NOTE — PROGRESS NOTES
Prior to immunization administration, verified patients identity using patient s name and date of birth. Please see Immunization Activity for additional information.     Screening Questionnaire for Adult Immunization    Are you sick today?   No   Do you have allergies to medications, food, a vaccine component or latex?   Yes   Have you ever had a serious reaction after receiving a vaccination?   No   Do you have a long-term health problem with heart, lung, kidney, or metabolic disease (e.g., diabetes), asthma, a blood disorder, no spleen, complement component deficiency, a cochlear implant, or a spinal fluid leak?  Are you on long-term aspirin therapy?   Yes   Do you have cancer, leukemia, HIV/AIDS, or any other immune system problem?   No   Do you have a parent, brother, or sister with an immune system problem?   No   In the past 3 months, have you taken medications that affect  your immune system, such as prednisone, other steroids, or anticancer drugs; drugs for the treatment of rheumatoid arthritis, Crohn s disease, or psoriasis; or have you had radiation treatments?   No   Have you had a seizure, or a brain or other nervous system problem?   No   During the past year, have you received a transfusion of blood or blood    products, or been given immune (gamma) globulin or antiviral drug?   No   For women: Are you pregnant or is there a chance you could become       pregnant during the next month?   No   Have you received any vaccinations in the past 4 weeks?   No     Immunization questionnaire was positive for at least one answer.  Notified RUBY Dickson.      Patient instructed to remain in clinic for 15 minutes afterwards, and to report any adverse reactions.     Screening performed by Bri Sarkar MA on 6/24/2024 at 4:31 PM.

## 2024-06-24 NOTE — PROGRESS NOTES
Assessment & Plan     Type 2 diabetes mellitus with hyperglycemia, without long-term current use of insulin (H)  Chronic issue, last A1C at 9.9, due for recheck today.  Labs that are due including A1C, urine microalbumin.  Referral for eye exam done today and diabetes educator.  Foot exam done today.  Tdap given.  Plan for recheck in 3-6 months.  - Albumin Random Urine Quantitative with Creat Ratio  - Adult Eye  Referral  - HEMOGLOBIN A1C  - Adult Diabetes Education  Referral  - FOOT EXAM  - TDAP 7+ (ADACEL,BOOSTRIX)    Morbid Obesity (H)  Chronic issue, BMI elevated in office, consider GLP-1 for weight loss and diabetes.    Atrial fibrillation, unspecified type (H)  Chronic issue, s/p PVI, on Xarelto.  Followed by cardiology.    Mild CAD  Patient due for lipid panel, currently on Crestor 20mg daily.  - Lipid panel reflex to direct LDL Non-fasting    Secondary cardiomyopathy (H)  Chronic issue, follows with cardiology.  Defer to cardiology to start SGLT2    Allergy to environmental factors  Chronic issue, unknown environmental trigger that causes anaphylaxis.  Rx for Epi Pen given.  - EPINEPHrine (ANY BX GENERIC EQUIV) 0.3 MG/0.3ML injection 2-pack  Dispense: 2 each; Refill: 2      Risks, benefits and alternatives were discussed with patient. Agreeable to the plan of care.    The longitudinal plan of care for the diagnosis(es)/condition(s) as documented were addressed during this visit. Due to the added complexity in care, I will continue to support the patient in the subsequent management and ongoing continuity of care.      Juan Luis Monteiro is a 53 year old, presenting for the following health issues:  Follow Up (Needs rx for epi pen.  No questions or concerns)      6/24/2024     3:49 PM   Additional Questions   Roomed by CELENA Sarkar CMA([facility])     History of Present Illness       Reason for visit:  Follow up    He eats 2-3 servings of fruits and vegetables daily.He consumes 0 sweetened  "beverage(s) daily.He exercises with enough effort to increase his heart rate 9 or less minutes per day.  He exercises with enough effort to increase his heart rate 3 or less days per week.   He is taking medications regularly.     Patient is here today for diabetes follow up  Last seen in 3/2024 and diagnosed with Type 2 diabetes  On Metformin BID  No checking sugars  Has not seen Diabetes Educator yet, needs new referral  He has been trying to reduce sugar intake- has cut out pop and sugary foods    He did recently have ablation for a fib, being followed by cardiology  On Xarelto, Crestor for CAD  Also has cardiomyopathy- managed by cardiology.    He is requesting refill of Epi Pen  Has seen allergist and done allergy testing a few times, unclear trigger but has environmental trigger that causes him to have anaphylactic response      Review of Systems  Constitutional, HEENT, cardiovascular, pulmonary, gi and gu systems are negative, except as otherwise noted.      Objective    BP (!) 145/74   Pulse (!) 46   Temp 97.8  F (36.6  C) (Oral)   Resp 10   Ht 1.753 m (5' 9\")   Wt 131.6 kg (290 lb 3.2 oz)   SpO2 98%   BMI 42.86 kg/m    Body mass index is 42.86 kg/m .  Physical Exam   GENERAL: alert and no distress  NECK: no adenopathy, no asymmetry, masses, or scars  RESP: lungs clear to auscultation - no rales, rhonchi or wheezes  CV: regular rate and rhythm, normal S1 S2, no S3 or S4, no murmur, click or rub, no peripheral edema  MS: no gross musculoskeletal defects noted, no edema  Diabetic foot exam: normal DP and PT pulses, no trophic changes or ulcerative lesions, normal sensory exam, and normal monofilament exam          Signed Electronically by: Angélica Dc PA-C    "

## 2024-06-25 LAB
ANION GAP SERPL CALCULATED.3IONS-SCNC: 9 MMOL/L (ref 7–15)
BUN SERPL-MCNC: 17.8 MG/DL (ref 6–20)
CALCIUM SERPL-MCNC: 9.4 MG/DL (ref 8.6–10)
CHLORIDE SERPL-SCNC: 100 MMOL/L (ref 98–107)
CHOLEST SERPL-MCNC: 143 MG/DL
CREAT SERPL-MCNC: 0.85 MG/DL (ref 0.67–1.17)
CREAT UR-MCNC: 199 MG/DL
DEPRECATED HCO3 PLAS-SCNC: 27 MMOL/L (ref 22–29)
EGFRCR SERPLBLD CKD-EPI 2021: >90 ML/MIN/1.73M2
FASTING STATUS PATIENT QL REPORTED: NO
FASTING STATUS PATIENT QL REPORTED: NO
GLUCOSE SERPL-MCNC: 245 MG/DL (ref 70–99)
HDLC SERPL-MCNC: 29 MG/DL
LDLC SERPL CALC-MCNC: 40 MG/DL
MICROALBUMIN UR-MCNC: 43.7 MG/L
MICROALBUMIN/CREAT UR: 21.96 MG/G CR (ref 0–17)
NONHDLC SERPL-MCNC: 114 MG/DL
POTASSIUM SERPL-SCNC: 3.4 MMOL/L (ref 3.4–5.3)
SODIUM SERPL-SCNC: 136 MMOL/L (ref 135–145)
TRIGL SERPL-MCNC: 370 MG/DL

## 2024-06-26 ENCOUNTER — TELEPHONE (OUTPATIENT)
Dept: FAMILY MEDICINE | Facility: CLINIC | Age: 54
End: 2024-06-26
Payer: COMMERCIAL

## 2024-06-26 DIAGNOSIS — E11.65 TYPE 2 DIABETES MELLITUS WITH HYPERGLYCEMIA, WITHOUT LONG-TERM CURRENT USE OF INSULIN (H): Primary | ICD-10-CM

## 2024-06-26 NOTE — TELEPHONE ENCOUNTER
Pt returned call, he voiced understanding, and had no additional questions.    He is agreeable to the prescriptions pcp would like to send over for him.    [id number]    Please advise.

## 2024-06-26 NOTE — TELEPHONE ENCOUNTER
"Attempted to call patient, left message for return call to clinic. Please relay provider notation below and assist with scheduling when patient returns call. Thanks!    Annalise Moreland RN    ----- Message from Angélica Dc sent at 6/26/2024  8:31 AM CDT -----  Bruno Monteiro,    Here are your recent labs:    Total cholesterol is 143, please continue exercise and watch diet.  Triglycerides are high at 370, this is simple sugar and fat in blood. HDL which is the \"good\" cholesterol (heart protective)  is low at 29. Increase this with more exercise.  The LDL or \"bad\" cholesterol is at 40. Recommend you continue your Crestor.    Your hemoglobin A1C which checks for diabetes was up to 10.5. I recommend we start you on insulin to get your glucose under control.  I will send in a pen and a glucose meter to check your glucose readings.  I would like you to give yourself 10 units at bedtime and check your sugars 3 times a day. Please keep a log of the glucose readings and bring them into your visit.    Please see if patient is agreeable to medications. Help him schedule with our diabetes educator as well.     Angélica Dc PA-C      "

## 2024-06-27 NOTE — TELEPHONE ENCOUNTER
Patient needs to be schedule with the diabetic educator. Please assist with scheduling when he calls back     Harmony Lang RN

## 2024-06-28 NOTE — TELEPHONE ENCOUNTER
Patient called call back in, stated that he'll check his calendar and give us a call back to schedule with the diabetic educator. Also, patient wanted to hold off on the insulin until he sees the diabetic educator.

## 2024-07-03 NOTE — PROGRESS NOTES
[facility]  Cardiac Electrophysiology  [address]  [place] [address]   Office: [phone]  Fax: [phone]     HEART CARE ELECTROPHYSIOLOGY NOTE     Primary Care: Angélica Dc MD       Assessment/Recommendations     Persistent atrial fibrillation/stage 3B: symptomatic with dyspnea on exertion, fatigue and decreased activity tolerance.  Status post PVI 5/23/2024. Unremarkable recovery without symptomatology or documentation of recurrent arrhythmia     [id number] score of 4-CAD, HTN, DM, cardiomyopathy    NICM: Presumably tachycardia mediated with negative MPI. Compensated without overt hypervolemia. On ARB, hydrochlorothiazide. TTE after 2-3 months sustained sinus rhythm     RADHA: Consistent use of CPAP    Plan:  Continue Xarelto 20 mg daily with dinner for stroke prophylaxis  Discontinue diltiazem - decrease to 1 capsule (240mg) every other day x10 days, then stop   Follow-up with Dr. Jones 6 weeks     History of Present Illness/Subjective    Cayetano JENNIFER Palmer is a 53 year old male with past medical history significant for persistent AF, HFrEF, CAD, HTN, non-insulin-dependent type 2 diabetes, obesity, RADHA, nicotine use, seen today for EP follow-up post ablation    Cayetano was first documented in atrial fibrillation 3/2024, after ongoing fatigue and dyspnea following treatment for pneumonia; TTE significant for LVEF 45-50%, mild LAE. He underwent PVI 5/23/2024.  He has felt very well over the past several weeks with significant improvement in fatigue.  His stamina at work has greatly improved as well, resumed yard work including planting trees without any exertional limitations. He has been quite motivated following recent diabetes diagnosis and recommendations to start insulin; fasting blood sugars are improved and he has lost nearly 10 pounds with dietary changes. He denies palpitations, dyspnea on exertion or rest, lightheadedness/dizziness, pedal  edema or syncope.     Data Review     Arrhythmia hx:   Dx/date: Persistent AF 3/2024  Sx: Fatigue, poor activity tolerance, dyspnea, lightheadedness, tachypalpitations  [id number], OAC: 4-CAD, HTN, DM, cardiomyopathy; rivaroxaban  Rate control: Diltiazem. Beta blocker intolerance   AAD: None  DCCV: None  Ablation: PVI 2024 (Dr. Jones)     2024: SR 89 bpm, AV delay    ==    EK/15/2024  bpm  Personally reviewed.     TTE 2024:   The left ventricle is normal in size. There is moderate concentric left  ventricular hypertrophy.  Difficult to accurately estimate LV systolic due to irregular filling cycles,  however grossly the the LVEF appears mildly reduced in the 45-50% range. There  is diffuse hypokinesis of the left ventricle.  The right ventricle is normal size. Right ventricular function cannot be  assessed due to poor image quality.  The left atrium is mildly dilated. Right atrium not well visualized.  IVC diameter <2.1 cm collapsing >50% with sniff suggests a normal RA pressure  of 3 mmHg.  Right ventricle systolic pressure estimate normal  There is no comparison study available.    Coronary Angiogram 2021  PRESENTATION / INDICATIONS  * CP, elevated troponin  VASCULAR ACCESS  * The radial artery was too small for vascular access.  * Using ultrasound guidance and a percutaneous technique, the right common femoral artery was accessed. Ultrasound was used to confirm vessel patency, localizing needle into the lumen of the vessel. An image was saved for the medical record.  DIAGNOSTIC - CORONARY  * Right dominant coronary artery system and large caliber vessels with mild coronary calcification.  * The left main artery has 20% eccentric stenosis in the mid portion.  * The LAD has minimal disease. Diagonals are moderate caliber with minimal disease.  * The circumflex artery has minimal disease proximally. OM1 is smaller with 20% ostial stenosis. OM2 is large and normal.  * The RCA is very  large with 60% proximal stenosis and 30% mid stenosis. The remainder of the vessel has minimal disease. Baseline FFR measured in the RCA was 1.0 and hyperemic (IV adenosine infusion) FFR was 0.93 indicating the disease is not hemodynamically significant.       I have reviewed and updated the patient's past medical history, allergies and medication list.               Physical Examination Review of Systems   /68 (BP Location: Right arm, Patient Position: Sitting, Cuff Size: Adult Large)   Pulse 62   Resp 14   Wt 129.7 kg (286 lb)   BMI 42.23 kg/m      Body mass index is 42.23 kg/m .    Wt Readings from Last 3 Encounters:   07/05/24 129.7 kg (286 lb)   06/24/24 131.6 kg (290 lb 3.2 oz)   05/23/24 132.9 kg (293 lb)     General   Appearance:   Alert and oriented, in no acute distress.    HEENT:  Normocephalic and atraumatic. Conjunctiva and sclera are clear. Moist oral mucosa.    Neck: No JVP, carotid bruit or obvious thyromegaly.   Lungs:   Respirations unlabored. Clear bilaterally with no rales, rhonchi, or wheezes.     Cardiovascular:   Rhythm is regular. S1 and S2 are normal. No significant murmur is present. Lower extremities demonstrate no significant edema. Posterior tibial pulses are intact bilaterally.   Extremities: No cyanosis or clubbing   Skin: Skin is warm, dry, and otherwise intact.   Neurologic: Gait not assessed. Mood and affect appropriate.                                                 Medical History  Surgical History Family History Social History   Past Medical History:   Diagnosis Date    Atrial fibrillation (H)     CAD (coronary artery disease)     Chronic systolic heart failure (H)     Diabetes mellitus, type 2 (H)     History of angioedema     up to x2 per year    Hypertension     Obesity     Sleep apnea     Past Surgical History:   Procedure Laterality Date    ARTHROSCOPY KNEE RT/LT Right     CARPAL TUNNEL RELEASE RT/LT Bilateral     EP ABLATION PULMONARY VEIN ISOLATION N/A 5/23/2024     Procedure: Ablation Atrial Fibrillation;  Surgeon: Vesta Jones MD;  Location: [facility] CV    Thoracotomy with Decortication Left     L lung w/ 3 chest tubes for PNA    No family history on file. Social History     Tobacco Use    Smoking status: Former     Current packs/day: 0.00     Types: Cigarettes     Quit date: 3/1/2024     Years since quittin.3    Smokeless tobacco: Never   Substance Use Topics    Alcohol use: Not Currently    Drug use: Never          Medications  Allergies   Current Outpatient Medications   Medication Sig Dispense Refill    blood glucose (NO BRAND SPECIFIED) test strip Use to test blood sugar 3 times daily or as directed. 100 strip 11    blood glucose monitoring (NO BRAND SPECIFIED) meter device kit Use to test blood sugar 3 times daily or as directed. 1 kit 0    diltiazem ER (DILT-XR) 240 MG 24 hr ER beaded capsule Take 1 capsule (240 mg) by mouth daily 90 capsule 2    EPINEPHrine (ANY BX GENERIC EQUIV) 0.3 MG/0.3ML injection 2-pack Inject 0.3 mLs (0.3 mg) into the muscle as needed for anaphylaxis 2 each 2    hydrochlorothiazide (HYDRODIURIL) 25 MG tablet Take 1 tablet (25 mg) by mouth daily 90 tablet 3    losartan (COZAAR) 100 MG tablet Take 1 tablet (100 mg) by mouth daily 90 tablet 3    metFORMIN (GLUCOPHAGE) 500 MG tablet TAKE 1 TABLET BY MOUTH TWICE A DAY WITH MEALS 180 tablet 2    rivaroxaban ANTICOAGULANT (XARELTO) 20 MG TABS tablet Take 1 tablet (20 mg) by mouth daily (with dinner) 30 tablet 3    rosuvastatin (CRESTOR) 20 MG tablet Take 1 tablet (20 mg) by mouth daily 90 tablet 3    acetaminophen (TYLENOL) 500 MG tablet Take 500-1,000 mg by mouth every 6 hours as needed for mild pain (Patient not taking: Reported on 2024)      insulin glargine (LANTUS PEN) 100 UNIT/ML pen Inject 10 Units Subcutaneous at bedtime (Patient not taking: Reported on 2024) 15 mL 11    Allergies   Allergen Reactions    Metoprolol Itching     Itching started <1h after taking  "this med 2x         Lab Results    Chemistry/lipid CBC Cardiac Enzymes/BNP/TSH/INR   Lab Results   Component Value Date    BUN 17.8 2024     2024    CO2 27 2024     No results found for: \"CREATININE\"    Lab Results   Component Value Date    CHOL 143 2024    HDL 29 (L) 2024    LDL 40 2024      Lab Results   Component Value Date    WBC 8.0 2024    HGB 15.0 2024    HCT 42.3 2024    MCV 87 2024     2024    Lab Results   Component Value Date    INR 1.10 2024        25 minutes spent reviewing prior records (including documentation, laboratory studies, cardiac testing/imaging), history and physical exam, planning, and subsequent documentation.     The longitudinal plan of care for the diagnosis(es)/condition(s) as documented were addressed during this visit. Due to the added complexity in care, I will continue to support Cayetano in the subsequent management and with ongoing continuity of care.     This note has been dictated using voice recognition software. Any grammatical, typographical, or context distortions are unintentional and inherent to the software.    Priti Horne, CNP  Clinical Cardiac Electrophysiology  [facility]  Clinic and [phone]  Fax: [phone]  Electrophysiology Nurses: [phone]                                 "

## 2024-07-05 ENCOUNTER — OFFICE VISIT (OUTPATIENT)
Dept: CARDIOLOGY | Facility: CLINIC | Age: 54
End: 2024-07-05
Payer: COMMERCIAL

## 2024-07-05 VITALS
RESPIRATION RATE: 14 BRPM | DIASTOLIC BLOOD PRESSURE: 68 MMHG | WEIGHT: 286 LBS | HEART RATE: 62 BPM | SYSTOLIC BLOOD PRESSURE: 120 MMHG | BODY MASS INDEX: 42.23 KG/M2

## 2024-07-05 DIAGNOSIS — G47.33 OSA (OBSTRUCTIVE SLEEP APNEA): ICD-10-CM

## 2024-07-05 DIAGNOSIS — I48.19 PERSISTENT ATRIAL FIBRILLATION (H): Primary | ICD-10-CM

## 2024-07-05 DIAGNOSIS — Z98.890 STATUS POST CATHETER ABLATION OF ATRIAL FIBRILLATION: ICD-10-CM

## 2024-07-05 DIAGNOSIS — I42.9 SECONDARY CARDIOMYOPATHY (H): ICD-10-CM

## 2024-07-05 PROCEDURE — G2211 COMPLEX E/M VISIT ADD ON: HCPCS | Performed by: NURSE PRACTITIONER

## 2024-07-05 PROCEDURE — 99214 OFFICE O/P EST MOD 30 MIN: CPT | Performed by: NURSE PRACTITIONER

## 2024-07-05 NOTE — LETTER
7/5/2024    Angélica Dc PA-C  [address]  [facility] [address]    RE: Cayetano Palmer       Dear Colleague,     I had the pleasure of seeing Cayetano Palmer in the [facility].       [facility]  Cardiac Electrophysiology  [address]  [place] [address]   Office: [phone]  Fax: [phone]     HEART CARE ELECTROPHYSIOLOGY NOTE     Primary Care: Angélica Dc MD       Assessment/Recommendations     Persistent atrial fibrillation/stage 3B: symptomatic with dyspnea on exertion, fatigue and decreased activity tolerance.  Status post PVI 5/23/2024. Unremarkable recovery without symptomatology or documentation of recurrent arrhythmia     [id number] score of 4-CAD, HTN, DM, cardiomyopathy    NICM: Presumably tachycardia mediated with negative MPI. Compensated without overt hypervolemia. On ARB, hydrochlorothiazide. TTE after 2-3 months sustained sinus rhythm     RADHA: Consistent use of CPAP    Plan:  Continue Xarelto 20 mg daily with dinner for stroke prophylaxis  Discontinue diltiazem - decrease to 1 capsule (240mg) every other day x10 days, then stop   Follow-up with Dr. Jones 6 weeks     History of Present Illness/Subjective    Cayetano Palmer is a 53 year old male with past medical history significant for persistent AF, HFrEF, CAD, HTN, non-insulin-dependent type 2 diabetes, obesity, RADHA, nicotine use, seen today for EP follow-up post ablation    Cayetano was first documented in atrial fibrillation 3/2024, after ongoing fatigue and dyspnea following treatment for pneumonia; TTE significant for LVEF 45-50%, mild LAE. He underwent PVI 5/23/2024.  He has felt very well over the past several weeks with significant improvement in fatigue.  His stamina at work has greatly improved as well, resumed yard work including planting trees without any exertional limitations. He has been quite motivated following recent diabetes diagnosis and  recommendations to start insulin; fasting blood sugars are improved and he has lost nearly 10 pounds with dietary changes. He denies palpitations, dyspnea on exertion or rest, lightheadedness/dizziness, pedal edema or syncope.     Data Review     Arrhythmia hx:   Dx/date: Persistent AF 3/2024  Sx: Fatigue, poor activity tolerance, dyspnea, lightheadedness, tachypalpitations  [id number], OAC: 4-CAD, HTN, DM, cardiomyopathy; rivaroxaban  Rate control: Diltiazem. Beta blocker intolerance   AAD: None  DCCV: None  Ablation: PVI 2024 (Dr. Jones)     2024: SR 89 bpm, AV delay    ==    EK/15/2024  bpm  Personally reviewed.     TTE 2024:   The left ventricle is normal in size. There is moderate concentric left  ventricular hypertrophy.  Difficult to accurately estimate LV systolic due to irregular filling cycles,  however grossly the the LVEF appears mildly reduced in the 45-50% range. There  is diffuse hypokinesis of the left ventricle.  The right ventricle is normal size. Right ventricular function cannot be  assessed due to poor image quality.  The left atrium is mildly dilated. Right atrium not well visualized.  IVC diameter <2.1 cm collapsing >50% with sniff suggests a normal RA pressure  of 3 mmHg.  Right ventricle systolic pressure estimate normal  There is no comparison study available.    Coronary Angiogram 2021  PRESENTATION / INDICATIONS  * CP, elevated troponin  VASCULAR ACCESS  * The radial artery was too small for vascular access.  * Using ultrasound guidance and a percutaneous technique, the right common femoral artery was accessed. Ultrasound was used to confirm vessel patency, localizing needle into the lumen of the vessel. An image was saved for the medical record.  DIAGNOSTIC - CORONARY  * Right dominant coronary artery system and large caliber vessels with mild coronary calcification.  * The left main artery has 20% eccentric stenosis in the mid portion.  * The LAD has  minimal disease. Diagonals are moderate caliber with minimal disease.  * The circumflex artery has minimal disease proximally. OM1 is smaller with 20% ostial stenosis. OM2 is large and normal.  * The RCA is very large with 60% proximal stenosis and 30% mid stenosis. The remainder of the vessel has minimal disease. Baseline FFR measured in the RCA was 1.0 and hyperemic (IV adenosine infusion) FFR was 0.93 indicating the disease is not hemodynamically significant.       I have reviewed and updated the patient's past medical history, allergies and medication list.               Physical Examination Review of Systems   /68 (BP Location: Right arm, Patient Position: Sitting, Cuff Size: Adult Large)   Pulse 62   Resp 14   Wt 129.7 kg (286 lb)   BMI 42.23 kg/m      Body mass index is 42.23 kg/m .    Wt Readings from Last 3 Encounters:   07/05/24 129.7 kg (286 lb)   06/24/24 131.6 kg (290 lb 3.2 oz)   05/23/24 132.9 kg (293 lb)     General   Appearance:   Alert and oriented, in no acute distress.    HEENT:  Normocephalic and atraumatic. Conjunctiva and sclera are clear. Moist oral mucosa.    Neck: No JVP, carotid bruit or obvious thyromegaly.   Lungs:   Respirations unlabored. Clear bilaterally with no rales, rhonchi, or wheezes.     Cardiovascular:   Rhythm is regular. S1 and S2 are normal. No significant murmur is present. Lower extremities demonstrate no significant edema. Posterior tibial pulses are intact bilaterally.   Extremities: No cyanosis or clubbing   Skin: Skin is warm, dry, and otherwise intact.   Neurologic: Gait not assessed. Mood and affect appropriate.                                                 Medical History  Surgical History Family History Social History   Past Medical History:   Diagnosis Date    Atrial fibrillation (H)     CAD (coronary artery disease)     Chronic systolic heart failure (H)     Diabetes mellitus, type 2 (H)     History of angioedema     up to x2 per year    Hypertension      Obesity     Sleep apnea     Past Surgical History:   Procedure Laterality Date    ARTHROSCOPY KNEE RT/LT Right     CARPAL TUNNEL RELEASE RT/LT Bilateral     EP ABLATION PULMONARY VEIN ISOLATION N/A 2024    Procedure: Ablation Atrial Fibrillation;  Surgeon: Vesta Jones MD;  Location: [facility] CV    Thoracotomy with Decortication Left     L lung w/ 3 chest tubes for PNA    No family history on file. Social History     Tobacco Use    Smoking status: Former     Current packs/day: 0.00     Types: Cigarettes     Quit date: 3/1/2024     Years since quittin.3    Smokeless tobacco: Never   Substance Use Topics    Alcohol use: Not Currently    Drug use: Never          Medications  Allergies   Current Outpatient Medications   Medication Sig Dispense Refill    blood glucose (NO BRAND SPECIFIED) test strip Use to test blood sugar 3 times daily or as directed. 100 strip 11    blood glucose monitoring (NO BRAND SPECIFIED) meter device kit Use to test blood sugar 3 times daily or as directed. 1 kit 0    diltiazem ER (DILT-XR) 240 MG 24 hr ER beaded capsule Take 1 capsule (240 mg) by mouth daily 90 capsule 2    EPINEPHrine (ANY BX GENERIC EQUIV) 0.3 MG/0.3ML injection 2-pack Inject 0.3 mLs (0.3 mg) into the muscle as needed for anaphylaxis 2 each 2    hydrochlorothiazide (HYDRODIURIL) 25 MG tablet Take 1 tablet (25 mg) by mouth daily 90 tablet 3    losartan (COZAAR) 100 MG tablet Take 1 tablet (100 mg) by mouth daily 90 tablet 3    metFORMIN (GLUCOPHAGE) 500 MG tablet TAKE 1 TABLET BY MOUTH TWICE A DAY WITH MEALS 180 tablet 2    rivaroxaban ANTICOAGULANT (XARELTO) 20 MG TABS tablet Take 1 tablet (20 mg) by mouth daily (with dinner) 30 tablet 3    rosuvastatin (CRESTOR) 20 MG tablet Take 1 tablet (20 mg) by mouth daily 90 tablet 3    acetaminophen (TYLENOL) 500 MG tablet Take 500-1,000 mg by mouth every 6 hours as needed for mild pain (Patient not taking: Reported on 2024)      insulin glargine  "(LANTUS PEN) 100 UNIT/ML pen Inject 10 Units Subcutaneous at bedtime (Patient not taking: Reported on 2024) 15 mL 11    Allergies   Allergen Reactions    Metoprolol Itching     Itching started <1h after taking this med 2x         Lab Results    Chemistry/lipid CBC Cardiac Enzymes/BNP/TSH/INR   Lab Results   Component Value Date    BUN 17.8 2024     2024    CO2 27 2024     No results found for: \"CREATININE\"    Lab Results   Component Value Date    CHOL 143 2024    HDL 29 (L) 2024    LDL 40 2024      Lab Results   Component Value Date    WBC 8.0 2024    HGB 15.0 2024    HCT 42.3 2024    MCV 87 2024     2024    Lab Results   Component Value Date    INR 1.10 2024        25 minutes spent reviewing prior records (including documentation, laboratory studies, cardiac testing/imaging), history and physical exam, planning, and subsequent documentation.     The longitudinal plan of care for the diagnosis(es)/condition(s) as documented were addressed during this visit. Due to the added complexity in care, I will continue to support Cayetano in the subsequent management and with ongoing continuity of care.     This note has been dictated using voice recognition software. Any grammatical, typographical, or context distortions are unintentional and inherent to the software.    Priti Horne CNP  Clinical Cardiac Electrophysiology  [facility]  Clinic and [phone]  Fax: [phone]  Electrophysiology Nurses: [phone]     Thank you for allowing me to participate in the care of your patient.      Sincerely,     DEONTE MULLINS CNP     [facility]  cc:   DEONTE Pearl CNP  HEART [facility] VASCULAR [address]  [address] [place] [address]  "

## 2024-07-08 ENCOUNTER — PATIENT OUTREACH (OUTPATIENT)
Dept: CARE COORDINATION | Facility: CLINIC | Age: 54
End: 2024-07-08
Payer: COMMERCIAL

## 2024-08-12 ENCOUNTER — VIRTUAL VISIT (OUTPATIENT)
Dept: EDUCATION SERVICES | Facility: CLINIC | Age: 54
End: 2024-08-12
Attending: PHYSICIAN ASSISTANT
Payer: COMMERCIAL

## 2024-08-12 DIAGNOSIS — E11.65 TYPE 2 DIABETES MELLITUS WITH HYPERGLYCEMIA, WITHOUT LONG-TERM CURRENT USE OF INSULIN (H): ICD-10-CM

## 2024-08-12 PROCEDURE — G0108 DIAB MANAGE TRN  PER INDIV: HCPCS | Mod: 93 | Performed by: DIETITIAN, REGISTERED

## 2024-08-12 NOTE — LETTER
8/12/2024         RE: Cayetano Palmer  [address]  [place] [address]        Dear Colleague,    Thank you for referring your patient, Cayetano Palmer, to the [facility] [place]. Please see a copy of my visit note below.    Diabetes Self-Management Education & Support    Presents for: Initial Assessment for new diagnosis    Type of Service: Telephone Visit    Originating Location (Patient Location): Home  Distant Location (Provider Location): Offsite  Mode of Communication:  Telephone    Telephone Visit Start Time:  3:34p  Telephone Visit End Time (telephone visit stop time): 4:10p    How would patient like to obtain AVS? MyChart      ASSESSMENT:  Cayetano was diagnosed with diabetes several months ago, thought by cutting out soda his A1C would go down but it has recently risen. He was prescribed insulin but felt confident that he didn't want to take it, started working more seriously on lifestyle adjustments and seen significant improvement in blood sugars. He is very motivated to maintain adjustments, feels they are sustainable but also asks good questions about what to focus on with diet. 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 He is willing to meet with [facility] one on one to continue to learn more about diabetes ed and management. He would like handouts mailed to him.     Patient's most recent   Lab Results   Component Value Date    A1C 10.5 06/24/2024     is not meeting goal of <7.0    Diabetes knowledge and skills assessment:   Patient is knowledgeable in diabetes management concepts related to: Healthy Eating and Being Active    Continue education with the following diabetes management concepts: Healthy Eating, Being Active, Monitoring, Taking Medication, Problem Solving, Reducing Risks, and Healthy Coping    Based on learning assessment above, most appropriate setting for further  "diabetes education would be: Individual setting.      PLAN    Continue to test blood sugar fasting and/or 2 hours after start of a meal and bring results to follow up appointment   Count carbohydrates at meals & snacks - 45-75 grams/meal & 15-30 grams/snack     Topics to cover at upcoming visits: Healthy Eating, Being Active, Monitoring, Taking Medication, Problem Solving, Reducing Risks, Healthy Coping and Diabetes Pathophysiology     Follow-up: 9/3/24    See Care Plan for co-developed, patient-state behavior change goals.  AVS provided for patient today.    Education Materials Provided:  Understanding Diabetes Booklet, blood sugar log sheet, MyPlate planner, Goals of Diabetes Care, Diabetes Medication information, & ADCES Diabetes Distress handout        SUBJECTIVE/OBJECTIVE:  Presents for: Initial Assessment for new diagnosis  Accompanied by: Self  Diabetes education in the past 24mo: No  Focus of Visit: Healthy Eating, Diabetes Pathophysiology  Diabetes type: Type 2  Date of diagnosis: 6/2024  Disease course: Improving  How confident are you filling out medical forms by yourself:: Quite a bit  Diabetes management related comments/concerns: Already made significant lifestyle changes to avoid insulin, seen considerable improvement in bgs  Transportation concerns: No  Difficulty affording diabetes medication?: No  Difficulty affording diabetes testing supplies?: No  Other concerns:: None  Cultural Influences/Ethnic Background:  Not  or     Diabetes Symptoms & Complications:  Weight trend: Decreasing  Disease course: Improving  Complications assessed today?: No    Patient Problem List and Family Medical History reviewed for relevant medical history, current medical status, and diabetes risk factors.    Vitals:  There were no vitals taken for this visit.  Estimated body mass index is 42.23 kg/m  as calculated from the following:    Height as of 6/24/24: 1.753 m (5' 9\").    Weight as of 7/5/24: 129.7 kg " "(286 lb).   Last 3 BP:   BP Readings from Last 3 Encounters:   07/05/24 120/68   06/24/24 (!) 142/76   05/23/24 112/79       History   Smoking Status     Former     Types: Cigarettes   Smokeless Tobacco     Never       Labs:  Lab Results   Component Value Date    A1C 10.5 06/24/2024     Lab Results   Component Value Date     06/24/2024     05/23/2024     Lab Results   Component Value Date    LDL 40 06/24/2024     Direct Measure HDL   Date Value Ref Range Status   06/24/2024 29 (L) >=40 mg/dL Final   ]  GFR Estimate   Date Value Ref Range Status   06/24/2024 >90 >60 mL/min/1.73m2 Final     Comment:     eGFR calculated using 2021 CKD-EPI equation.     No results found for: \"GFRESTBLACK\"  Lab Results   Component Value Date    CR 0.85 06/24/2024     No results found for: \"MICROALBUMIN\"    Healthy Eating:  Healthy Eating Assessed Today: Yes  Cultural/Mandaeism diet restrictions?: Yes  Meal planning/habits: Low carb, Smaller portions  Who cooks/prepares meals for you?: Self  Who purchases food in  your home?: Self  Meals include: Breakfast, Lunch, Dinner  Breakfast: cup of coffee; 9a - [employer] Protein shake  Lunch: Salad + protein  Dinner: Salad - spring mix, jason lettuce, peppers, onions, black olives, feta cheese, tomatoes, cucumbers, balsamic vinagrette + lean protein (shrimp OR elk OR game meat)  Beverages: Water  Has patient met with a dietitian in the past?: No    Being Active:  Being Active Assessed Today: Yes  Exercise:: Yes  Days per week of moderate to strenuous exercise (like a brisk walk): 7  On average, minutes per day of exercise at this level: 20  How intense was your typical exercise? : Moderate (like brisk walking)  Exercise Minutes per Week: 140  Barrier to exercise: None    Monitoring:  Monitoring Assessed Today: Yes  Did patient bring glucose meter to appointment? : No  Blood Glucose Meter: Unknown  Times checking blood sugar at home (number): 3  Times checking blood sugar at home " (per): Day  Blood glucose trend: Decreasing    Glucometer:   First check - 284 (6/28)  Testing fasting, PM when home form work, and 2 hrs after dinner    7 day average = 129  14 day average = 134  30 day average = 139    Taking Medications:  Diabetes Medication(s)       Biguanides       metFORMIN (GLUCOPHAGE) 500 MG tablet TAKE 1 TABLET BY MOUTH TWICE A DAY WITH MEALS       Insulin       insulin glargine (LANTUS PEN) 100 UNIT/ML pen Inject 10 Units Subcutaneous at bedtime     Patient not taking: Reported on 7/5/2024            Taking Medication Assessed Today: Yes  Current Treatments: Oral Medication (taken by mouth)  Problems taking diabetes medications regularly?: No  Diabetes medication side effects?: No    Problem Solving:  Problem Solving Assessed Today: No              Reducing Risks:  Reducing Risks Assessed Today: No  Diabetes Risks: Age over 45 years  CAD Risks: Diabetes Mellitus, Male sex, Obesity    Healthy Coping:  Healthy Coping Assessed Today: Yes  Emotional response to diabetes: Ready to learn, Concern for health and well-being, Guilt/Self-blame  Informal Support system:: Family  Stage of change: ACTION (Actively working towards change)  Support resources: Other  Patient Activation Measure Survey Score:       No data to display                  Care Plan and Education Provided:  Healthy Eating: Balanced meals, Carbohydrate Counting, Consistency in amount and timing of carbohydrate intake, and Portion control, Monitoring: Individual glucose targets and Log and interpret results, and Taking Medication: Action of prescribed medication(s), Side effects of prescribed medication(s), and When to take medication(s)    Lorri Pradhan RD, LD, [facility]     Time Spent: 36 minutes  Encounter Type: Individual    Any diabetes medication dose changes were made via the CDE Protocol per the patient's referring provider. A copy of this encounter was shared with the provider.

## 2024-08-12 NOTE — PROGRESS NOTES
Diabetes Self-Management Education & Support    Presents for: Initial Assessment for new diagnosis    Type of Service: Telephone Visit    Originating Location (Patient Location): Home  Distant Location (Provider Location): Offsite  Mode of Communication:  Telephone    Telephone Visit Start Time:  3:34p  Telephone Visit End Time (telephone visit stop time): 4:10p    How would patient like to obtain AVS? Kilo      ASSESSMENT:  Cayetano was diagnosed with diabetes several months ago, thought by cutting out soda his A1C would go down but it has recently risen. He was prescribed insulin but felt confident that he didn't want to take it, started working more seriously on lifestyle adjustments and seen significant improvement in blood sugars. He is very motivated to maintain adjustments, feels they are sustainable but also asks good questions about what to focus on with diet. 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 He is willing to meet with [facility] one on one to continue to learn more about diabetes ed and management. He would like handouts mailed to him.     Patient's most recent   Lab Results   Component Value Date    A1C 10.5 06/24/2024     is not meeting goal of <7.0    Diabetes knowledge and skills assessment:   Patient is knowledgeable in diabetes management concepts related to: Healthy Eating and Being Active    Continue education with the following diabetes management concepts: Healthy Eating, Being Active, Monitoring, Taking Medication, Problem Solving, Reducing Risks, and Healthy Coping    Based on learning assessment above, most appropriate setting for further diabetes education would be: Individual setting.      PLAN    Continue to test blood sugar fasting and/or 2 hours after start of a meal and bring results to follow up appointment   Count carbohydrates at meals & snacks - 45-75 grams/meal & 15-30 grams/snack  "    Topics to cover at upcoming visits: Healthy Eating, Being Active, Monitoring, Taking Medication, Problem Solving, Reducing Risks, Healthy Coping and Diabetes Pathophysiology     Follow-up: 9/3/24    See Care Plan for co-developed, patient-state behavior change goals.  AVS provided for patient today.    Education Materials Provided:  Understanding Diabetes Booklet, blood sugar log sheet, MyPlate planner, Goals of Diabetes Care, Diabetes Medication information, & ADCES Diabetes Distress handout        SUBJECTIVE/OBJECTIVE:  Presents for: Initial Assessment for new diagnosis  Accompanied by: Self  Diabetes education in the past 24mo: No  Focus of Visit: Healthy Eating, Diabetes Pathophysiology  Diabetes type: Type 2  Date of diagnosis: 6/2024  Disease course: Improving  How confident are you filling out medical forms by yourself:: Quite a bit  Diabetes management related comments/concerns: Already made significant lifestyle changes to avoid insulin, seen considerable improvement in bgs  Transportation concerns: No  Difficulty affording diabetes medication?: No  Difficulty affording diabetes testing supplies?: No  Other concerns:: None  Cultural Influences/Ethnic Background:  Not  or     Diabetes Symptoms & Complications:  Weight trend: Decreasing  Disease course: Improving  Complications assessed today?: No    Patient Problem List and Family Medical History reviewed for relevant medical history, current medical status, and diabetes risk factors.    Vitals:  There were no vitals taken for this visit.  Estimated body mass index is 42.23 kg/m  as calculated from the following:    Height as of 6/24/24: 1.753 m (5' 9\").    Weight as of 7/5/24: 129.7 kg (286 lb).   Last 3 BP:   BP Readings from Last 3 Encounters:   07/05/24 120/68   06/24/24 (!) 142/76   05/23/24 112/79       History   Smoking Status    Former    Types: Cigarettes   Smokeless Tobacco    Never       Labs:  Lab Results   Component Value Date " "   A1C 10.5 06/24/2024     Lab Results   Component Value Date     06/24/2024     05/23/2024     Lab Results   Component Value Date    LDL 40 06/24/2024     Direct Measure HDL   Date Value Ref Range Status   06/24/2024 29 (L) >=40 mg/dL Final   ]  GFR Estimate   Date Value Ref Range Status   06/24/2024 >90 >60 mL/min/1.73m2 Final     Comment:     eGFR calculated using 2021 CKD-EPI equation.     No results found for: \"GFRESTBLACK\"  Lab Results   Component Value Date    CR 0.85 06/24/2024     No results found for: \"MICROALBUMIN\"    Healthy Eating:  Healthy Eating Assessed Today: Yes  Cultural/Denominational diet restrictions?: Yes  Meal planning/habits: Low carb, Smaller portions  Who cooks/prepares meals for you?: Self  Who purchases food in  your home?: Self  Meals include: Breakfast, Lunch, Dinner  Breakfast: cup of coffee; 9a - [employer] Protein shake  Lunch: Salad + protein  Dinner: Salad - spring mix, jason lettuce, peppers, onions, black olives, feta cheese, tomatoes, cucumbers, balsamic vinagrette + lean protein (shrimp OR elk OR game meat)  Beverages: Water  Has patient met with a dietitian in the past?: No    Being Active:  Being Active Assessed Today: Yes  Exercise:: Yes  Days per week of moderate to strenuous exercise (like a brisk walk): 7  On average, minutes per day of exercise at this level: 20  How intense was your typical exercise? : Moderate (like brisk walking)  Exercise Minutes per Week: 140  Barrier to exercise: None    Monitoring:  Monitoring Assessed Today: Yes  Did patient bring glucose meter to appointment? : No  Blood Glucose Meter: Unknown  Times checking blood sugar at home (number): 3  Times checking blood sugar at home (per): Day  Blood glucose trend: Decreasing    Glucometer:   First check - 284 (6/28)  Testing fasting, PM when home form work, and 2 hrs after dinner    7 day average = 129  14 day average = 134  30 day average = 139    Taking Medications:  Diabetes Medication(s) "       Biguanides       metFORMIN (GLUCOPHAGE) 500 MG tablet TAKE 1 TABLET BY MOUTH TWICE A DAY WITH MEALS       Insulin       insulin glargine (LANTUS PEN) 100 UNIT/ML pen Inject 10 Units Subcutaneous at bedtime     Patient not taking: Reported on 7/5/2024            Taking Medication Assessed Today: Yes  Current Treatments: Oral Medication (taken by mouth)  Problems taking diabetes medications regularly?: No  Diabetes medication side effects?: No    Problem Solving:  Problem Solving Assessed Today: No              Reducing Risks:  Reducing Risks Assessed Today: No  Diabetes Risks: Age over 45 years  CAD Risks: Diabetes Mellitus, Male sex, Obesity    Healthy Coping:  Healthy Coping Assessed Today: Yes  Emotional response to diabetes: Ready to learn, Concern for health and well-being, Guilt/Self-blame  Informal Support system:: Family  Stage of change: ACTION (Actively working towards change)  Support resources: Other  Patient Activation Measure Survey Score:       No data to display                  Care Plan and Education Provided:  Healthy Eating: Balanced meals, Carbohydrate Counting, Consistency in amount and timing of carbohydrate intake, and Portion control, Monitoring: Individual glucose targets and Log and interpret results, and Taking Medication: Action of prescribed medication(s), Side effects of prescribed medication(s), and When to take medication(s)    Lorri Pradhan RD, LD, [facility]     Time Spent: 36 minutes  Encounter Type: Individual    Any diabetes medication dose changes were made via the CDE Protocol per the patient's referring provider. A copy of this encounter was shared with the provider.

## 2024-08-14 ENCOUNTER — OFFICE VISIT (OUTPATIENT)
Dept: CARDIOLOGY | Facility: CLINIC | Age: 54
End: 2024-08-14
Payer: COMMERCIAL

## 2024-08-14 VITALS
WEIGHT: 277.2 LBS | RESPIRATION RATE: 16 BRPM | SYSTOLIC BLOOD PRESSURE: 124 MMHG | BODY MASS INDEX: 41.06 KG/M2 | HEIGHT: 69 IN | HEART RATE: 65 BPM | DIASTOLIC BLOOD PRESSURE: 76 MMHG

## 2024-08-14 DIAGNOSIS — Z98.890 STATUS POST CATHETER ABLATION OF ATRIAL FIBRILLATION: ICD-10-CM

## 2024-08-14 DIAGNOSIS — I48.19 PERSISTENT ATRIAL FIBRILLATION (H): ICD-10-CM

## 2024-08-14 PROCEDURE — 99214 OFFICE O/P EST MOD 30 MIN: CPT | Performed by: INTERNAL MEDICINE

## 2024-08-14 NOTE — PROGRESS NOTES
HEART CARE ENCOUNTER CONSULTATON NOTE      [facility]  [phone]      Assessment/Recommendations   Assessment/Plan:    Cayetano Palmer is a very pleasant 54 year old male with PMH of persistent atrial fibrillation, cardiomyopathy (LVEF 45-50%), CAD, HTN, DM, obesity who presents today to the EP clinic.    Persistent atrial fibrillation  - s/p PVI on 5/23/2024  -Diltiazem was tapered and stopped 6 weeks post ablation   - no recurrence of AF noted  - we discussed the ongoing importance of lifestyle modification (maintaining a healthy weight, sleep apnea diagnosis and management, alcohol avoidance) as part of a long term strategy for atrial fibrillation management      2. Anticoagulation  - CHADSVASc score 4  - On Xarelto     3. Cardiomyopathy  - continue losartan  - will repeat a TTE to check LVEF post ablation    4. CAD  - stress test negative; stable  - continue current meds    5. HTN  - well controlled    6. RADHA  - uses CPAP    Follow up in one year      Time spent: 30 minutes spent on the date of the encounter doing chart review, history and exam, documentation and further activities as noted above.    The longitudinal plan of care for the condition(s) below were addressed during this visit. Due to the added complexity in care, I will continue support in the subsequent management of this condition(s) and with the ongoing continuity of care of this condition(s).          History of Present Illness/Subjective    HPI: Cayetano Palmer is a very pleasant 53 year old male with PMH of persistent atrial fibrillation, cardiomyopathy (LVEF 45-50%), CAD, HTN, DM, obesity who presents today to the EP clinic.    Cayetano was diagnosed with atrial fibrillation in March 2024. He was diagnosed with a pneumonia in March for which he was given antibiotics. He continued to have symptoms of fatigue and shortness of breath even after a week of antibiotics so he went back to the ER and was diagnosed with AF at the  "time.     He is currently on Diltiazem 240 mg daily and Xarelto for anticoagulation.    He had a TTE which showed an LVEF of 45-50%. A nuclear stress test was subsequently ordered which showed no signs of ischemia.    Alcohol use- about twice a year    RADHA - Uses CPAP regularly    August 2023    During our last clinic visit an A-fib ablation was discussed, offered and agreed upon.  He underwent a successful and uncomplicated pulmonary vein isolation on 5/23/2024.  Diltiazem was tapered and discontinued 6 weeks post ablation. He has not had any AF since the ablation    He has been watching his diet and has lost about 33 lbs since the highest he has weighed.       Recent Echocardiogram Results (personally reviewed):    April 2024    Interpretation Summary     The left ventricle is normal in size. There is moderate concentric left  ventricular hypertrophy.  Difficult to accurately estimate LV systolic due to irregular filling cycles,  however grossly the the LVEF appears mildly reduced in the 45-50% range. There  is diffuse hypokinesis of the left ventricle.    Stress test       Lexiscan stress ECG negative for ischemia.    The nuclear stress test is abnormal.  There is a small area of nontransmural infarction involving the mid to basal inferior and inferolateral wall.  No ischemia is identified.    The left ventricular ejection fraction at stress is 42% with global hypokinesis.    There is no prior study for comparison.    Labs below reviewed personally     Physical Examination  Review of Systems   Vitals: /76 (BP Location: Right arm, Patient Position: Sitting, Cuff Size: Adult Regular)   Pulse 65   Resp 16   Ht 1.753 m (5' 9\")   Wt 125.7 kg (277 lb 3.2 oz)   BMI 40.94 kg/m    BMI= Body mass index is 40.94 kg/m .  Wt Readings from Last 3 Encounters:   08/14/24 125.7 kg (277 lb 3.2 oz)   07/05/24 129.7 kg (286 lb)   06/24/24 131.6 kg (290 lb 3.2 oz)       General Appearance:   no distress, normal body " habitus   ENT/Mouth: membranes moist, no oral lesions or bleeding gums.      EYES:  no scleral icterus, normal conjunctivae   Neck: no carotid bruits or thyromegaly   Chest/Lungs:   lungs are clear to auscultation, no rales or wheezing, no sternal scar, equal chest wall expansion    Cardiovascular:   Irregular. Normal first and second heart sounds with no murmurs, rubs, or gallops; the carotid, radial and posterior tibial pulses are intact, no edema bilaterally    Abdomen:  no organomegaly, masses, bruits, or tenderness; bowel sounds are present   Extremities: no cyanosis or clubbing   Skin: no xanthelasma, warm.    Neurologic: normal  bilateral, no tremors     Psychiatric: alert and oriented x3, calm        Please refer above for cardiac ROS details.        Medical History  Surgical History Family History Social History   Past Medical History:   Diagnosis Date    Atrial fibrillation (H)     CAD (coronary artery disease)     Chronic systolic heart failure (H)     Diabetes mellitus, type 2 (H)     History of angioedema     up to x2 per year    Hypertension     Obesity     Sleep apnea      Past Surgical History:   Procedure Laterality Date    ARTHROSCOPY KNEE RT/LT Right     CARPAL TUNNEL RELEASE RT/LT Bilateral     EP ABLATION PULMONARY VEIN ISOLATION N/A 2024    Procedure: Ablation Atrial Fibrillation;  Surgeon: Vesta Jones MD;  Location: [facility] CV    Thoracotomy with Decortication Left     L lung w/ 3 chest tubes for PNA     No family history on file.     Social History     Socioeconomic History    Marital status:      Spouse name: Not on file    Number of children: Not on file    Years of education: Not on file    Highest education level: Not on file   Occupational History    Not on file   Tobacco Use    Smoking status: Former     Current packs/day: 0.00     Types: Cigarettes     Quit date: 3/1/2024     Years since quittin.4    Smokeless tobacco: Never   Substance and Sexual  Activity    Alcohol use: Not Currently    Drug use: Never    Sexual activity: Not on file   Other Topics Concern    Not on file   Social History Narrative    Not on file     Social Determinants of Health     Financial Resource Strain: Low Risk  (3/26/2024)    Financial Resource Strain     Within the past 12 months, have you or your family members you live with been unable to get utilities (heat, electricity) when it was really needed?: No   Food Insecurity: Low Risk  (3/26/2024)    Food Insecurity     Within the past 12 months, did you worry that your food would run out before you got money to buy more?: No     Within the past 12 months, did the food you bought just not last and you didn t have money to get more?: No   Transportation Needs: Low Risk  (3/26/2024)    Transportation Needs     Within the past 12 months, has lack of transportation kept you from medical appointments, getting your medicines, non-medical meetings or appointments, work, or from getting things that you need?: No   Physical Activity: Not on file   Stress: Not on file   Social Connections: Unknown (1/1/2022)    Received from [facility], [facility]    Social Connections     Frequency of Communication with Friends and Family: Not on file   Interpersonal Safety: Low Risk  (6/24/2024)    Interpersonal Safety     Do you feel physically and emotionally safe where you currently live?: Yes     Within the past 12 months, have you been hit, slapped, kicked or otherwise physically hurt by someone?: No     Within the past 12 months, have you been humiliated or emotionally abused in other ways by your partner or ex-partner?: No   Housing Stability: Low Risk  (3/26/2024)    Housing Stability     Do you have housing? : Yes     Are you worried about losing your housing?: No           Medications  Allergies   Current Outpatient Medications   Medication Sig Dispense Refill    acetaminophen  "(TYLENOL) 500 MG tablet Take 500-1,000 mg by mouth every 6 hours as needed for mild pain      blood glucose (NO BRAND SPECIFIED) test strip Use to test blood sugar 3 times daily or as directed. 100 strip 11    blood glucose monitoring (NO BRAND SPECIFIED) meter device kit Use to test blood sugar 3 times daily or as directed. 1 kit 0    EPINEPHrine (ANY BX GENERIC EQUIV) 0.3 MG/0.3ML injection 2-pack Inject 0.3 mLs (0.3 mg) into the muscle as needed for anaphylaxis 2 each 2    hydrochlorothiazide (HYDRODIURIL) 25 MG tablet Take 1 tablet (25 mg) by mouth daily 90 tablet 3    losartan (COZAAR) 100 MG tablet Take 1 tablet (100 mg) by mouth daily 90 tablet 3    metFORMIN (GLUCOPHAGE) 500 MG tablet TAKE 1 TABLET BY MOUTH TWICE A DAY WITH MEALS 180 tablet 2    rivaroxaban ANTICOAGULANT (XARELTO) 20 MG TABS tablet Take 1 tablet (20 mg) by mouth daily (with dinner) 30 tablet 3    rosuvastatin (CRESTOR) 20 MG tablet Take 1 tablet (20 mg) by mouth daily 90 tablet 3    diltiazem ER (DILT-XR) 240 MG 24 hr ER beaded capsule Take 1 capsule (240 mg) by mouth daily (Patient not taking: Reported on 8/14/2024) 90 capsule 2    insulin glargine (LANTUS PEN) 100 UNIT/ML pen Inject 10 Units Subcutaneous at bedtime (Patient not taking: Reported on 8/14/2024) 15 mL 11       Allergies   Allergen Reactions    Metoprolol Itching     Itching started <1h after taking this med 2x          Lab Results    Chemistry/lipid CBC Cardiac Enzymes/BNP/TSH/INR   No results for input(s): \"CHOL\", \"HDL\", \"LDL\", \"TRIG\", \"CHOLHDLRATIO\" in the last 82889 hours.  No results for input(s): \"LDL\" in the last 92421 hours.  Recent Labs   Lab Test 03/14/24  1711      POTASSIUM 4.0   CHLORIDE 102   CO2 26   *   BUN 18.0   CR 0.91   GFRESTIMATED >90   SHEELA 9.4     Recent Labs   Lab Test 03/14/24  1711 03/05/24  1813   CR 0.91 0.86     Recent Labs   Lab Test 03/05/24  1813   A1C 9.9*          Recent Labs   Lab Test 03/14/24  1711   WBC 11.1*   HGB 16.3   HCT " "46.9   MCV 90        Recent Labs   Lab Test 03/14/24  1711 03/05/24  1813   HGB 16.3 16.1    No results for input(s): \"TROPONINI\" in the last 93708 hours.  Recent Labs   Lab Test 03/05/24  1813   NTBNPI 903*     No results for input(s): \"TSH\" in the last 86635 hours.  Recent Labs   Lab Test 03/14/24  1711   INR 1.10        Vesta Jones MD                                      "

## 2024-08-14 NOTE — LETTER
8/14/2024    Angélica Dc PA-C  [address]  [facility] [address]    RE: Cayetano Palmer       Dear Colleague,     I had the pleasure of seeing Cayetano Palmer in the [facility].    HEART CARE ENCOUNTER CONSULTATON NOTE      [facility]  [phone]      Assessment/Recommendations   Assessment/Plan:    Cayetano Palmer is a very pleasant 54 year old male with PMH of persistent atrial fibrillation, cardiomyopathy (LVEF 45-50%), CAD, HTN, DM, obesity who presents today to the EP clinic.    Persistent atrial fibrillation  - s/p PVI on 5/23/2024  -Diltiazem was tapered and stopped 6 weeks post ablation   - no recurrence of AF noted  - we discussed the ongoing importance of lifestyle modification (maintaining a healthy weight, sleep apnea diagnosis and management, alcohol avoidance) as part of a long term strategy for atrial fibrillation management      2. Anticoagulation  - CHADSVASc score 4  - On Xarelto     3. Cardiomyopathy  - continue losartan  - will repeat a TTE to check LVEF post ablation    4. CAD  - stress test negative; stable  - continue current meds    5. HTN  - well controlled    6. RADHA  - uses CPAP    Follow up in one year      Time spent: 30 minutes spent on the date of the encounter doing chart review, history and exam, documentation and further activities as noted above.    The longitudinal plan of care for the condition(s) below were addressed during this visit. Due to the added complexity in care, I will continue support in the subsequent management of this condition(s) and with the ongoing continuity of care of this condition(s).          History of Present Illness/Subjective    HPI: Cayetano Palmer is a very pleasant 53 year old male with PMH of persistent atrial fibrillation, cardiomyopathy (LVEF 45-50%), CAD, HTN, DM, obesity who presents today to the EP clinic.    Cayetano was diagnosed with atrial fibrillation in March 2024. He was diagnosed with a  "pneumonia in March for which he was given antibiotics. He continued to have symptoms of fatigue and shortness of breath even after a week of antibiotics so he went back to the ER and was diagnosed with AF at the time.     He is currently on Diltiazem 240 mg daily and Xarelto for anticoagulation.    He had a TTE which showed an LVEF of 45-50%. A nuclear stress test was subsequently ordered which showed no signs of ischemia.    Alcohol use- about twice a year    RADHA - Uses CPAP regularly    August 2023    During our last clinic visit an A-fib ablation was discussed, offered and agreed upon.  He underwent a successful and uncomplicated pulmonary vein isolation on 5/23/2024.  Diltiazem was tapered and discontinued 6 weeks post ablation. He has not had any AF since the ablation    He has been watching his diet and has lost about 33 lbs since the highest he has weighed.       Recent Echocardiogram Results (personally reviewed):    April 2024    Interpretation Summary     The left ventricle is normal in size. There is moderate concentric left  ventricular hypertrophy.  Difficult to accurately estimate LV systolic due to irregular filling cycles,  however grossly the the LVEF appears mildly reduced in the 45-50% range. There  is diffuse hypokinesis of the left ventricle.    Stress test        Lexiscan stress ECG negative for ischemia.     The nuclear stress test is abnormal.  There is a small area of nontransmural infarction involving the mid to basal inferior and inferolateral wall.  No ischemia is identified.     The left ventricular ejection fraction at stress is 42% with global hypokinesis.     There is no prior study for comparison.    Labs below reviewed personally     Physical Examination  Review of Systems   Vitals: /76 (BP Location: Right arm, Patient Position: Sitting, Cuff Size: Adult Regular)   Pulse 65   Resp 16   Ht 1.753 m (5' 9\")   Wt 125.7 kg (277 lb 3.2 oz)   BMI 40.94 kg/m    BMI= Body mass " index is 40.94 kg/m .  Wt Readings from Last 3 Encounters:   08/14/24 125.7 kg (277 lb 3.2 oz)   07/05/24 129.7 kg (286 lb)   06/24/24 131.6 kg (290 lb 3.2 oz)       General Appearance:   no distress, normal body habitus   ENT/Mouth: membranes moist, no oral lesions or bleeding gums.      EYES:  no scleral icterus, normal conjunctivae   Neck: no carotid bruits or thyromegaly   Chest/Lungs:   lungs are clear to auscultation, no rales or wheezing, no sternal scar, equal chest wall expansion    Cardiovascular:   Irregular. Normal first and second heart sounds with no murmurs, rubs, or gallops; the carotid, radial and posterior tibial pulses are intact, no edema bilaterally    Abdomen:  no organomegaly, masses, bruits, or tenderness; bowel sounds are present   Extremities: no cyanosis or clubbing   Skin: no xanthelasma, warm.    Neurologic: normal  bilateral, no tremors     Psychiatric: alert and oriented x3, calm        Please refer above for cardiac ROS details.        Medical History  Surgical History Family History Social History   Past Medical History:   Diagnosis Date     Atrial fibrillation (H)      CAD (coronary artery disease)      Chronic systolic heart failure (H)      Diabetes mellitus, type 2 (H)      History of angioedema     up to x2 per year     Hypertension      Obesity      Sleep apnea      Past Surgical History:   Procedure Laterality Date     ARTHROSCOPY KNEE RT/LT Right      CARPAL TUNNEL RELEASE RT/LT Bilateral      EP ABLATION PULMONARY VEIN ISOLATION N/A 5/23/2024    Procedure: Ablation Atrial Fibrillation;  Surgeon: Vesta Jones MD;  Location: [facility] CV     Thoracotomy with Decortication Left     L lung w/ 3 chest tubes for PNA     No family history on file.     Social History     Socioeconomic History     Marital status:      Spouse name: Not on file     Number of children: Not on file     Years of education: Not on file     Highest education level: Not on file    Occupational History     Not on file   Tobacco Use     Smoking status: Former     Current packs/day: 0.00     Types: Cigarettes     Quit date: 3/1/2024     Years since quittin.4     Smokeless tobacco: Never   Substance and Sexual Activity     Alcohol use: Not Currently     Drug use: Never     Sexual activity: Not on file   Other Topics Concern     Not on file   Social History Narrative     Not on file     Social Determinants of Health     Financial Resource Strain: Low Risk  (3/26/2024)    Financial Resource Strain      Within the past 12 months, have you or your family members you live with been unable to get utilities (heat, electricity) when it was really needed?: No   Food Insecurity: Low Risk  (3/26/2024)    Food Insecurity      Within the past 12 months, did you worry that your food would run out before you got money to buy more?: No      Within the past 12 months, did the food you bought just not last and you didn t have money to get more?: No   Transportation Needs: Low Risk  (3/26/2024)    Transportation Needs      Within the past 12 months, has lack of transportation kept you from medical appointments, getting your medicines, non-medical meetings or appointments, work, or from getting things that you need?: No   Physical Activity: Not on file   Stress: Not on file   Social Connections: Unknown (2022)    Received from [facility], [facility]    Social Connections      Frequency of Communication with Friends and Family: Not on file   Interpersonal Safety: Low Risk  (2024)    Interpersonal Safety      Do you feel physically and emotionally safe where you currently live?: Yes      Within the past 12 months, have you been hit, slapped, kicked or otherwise physically hurt by someone?: No      Within the past 12 months, have you been humiliated or emotionally abused in other ways by your partner or ex-partner?: No   Housing  "Stability: Low Risk  (3/26/2024)    Housing Stability      Do you have housing? : Yes      Are you worried about losing your housing?: No           Medications  Allergies   Current Outpatient Medications   Medication Sig Dispense Refill     acetaminophen (TYLENOL) 500 MG tablet Take 500-1,000 mg by mouth every 6 hours as needed for mild pain       blood glucose (NO BRAND SPECIFIED) test strip Use to test blood sugar 3 times daily or as directed. 100 strip 11     blood glucose monitoring (NO BRAND SPECIFIED) meter device kit Use to test blood sugar 3 times daily or as directed. 1 kit 0     EPINEPHrine (ANY BX GENERIC EQUIV) 0.3 MG/0.3ML injection 2-pack Inject 0.3 mLs (0.3 mg) into the muscle as needed for anaphylaxis 2 each 2     hydrochlorothiazide (HYDRODIURIL) 25 MG tablet Take 1 tablet (25 mg) by mouth daily 90 tablet 3     losartan (COZAAR) 100 MG tablet Take 1 tablet (100 mg) by mouth daily 90 tablet 3     metFORMIN (GLUCOPHAGE) 500 MG tablet TAKE 1 TABLET BY MOUTH TWICE A DAY WITH MEALS 180 tablet 2     rivaroxaban ANTICOAGULANT (XARELTO) 20 MG TABS tablet Take 1 tablet (20 mg) by mouth daily (with dinner) 30 tablet 3     rosuvastatin (CRESTOR) 20 MG tablet Take 1 tablet (20 mg) by mouth daily 90 tablet 3     diltiazem ER (DILT-XR) 240 MG 24 hr ER beaded capsule Take 1 capsule (240 mg) by mouth daily (Patient not taking: Reported on 8/14/2024) 90 capsule 2     insulin glargine (LANTUS PEN) 100 UNIT/ML pen Inject 10 Units Subcutaneous at bedtime (Patient not taking: Reported on 8/14/2024) 15 mL 11       Allergies   Allergen Reactions     Metoprolol Itching     Itching started <1h after taking this med 2x          Lab Results    Chemistry/lipid CBC Cardiac Enzymes/BNP/TSH/INR   No results for input(s): \"CHOL\", \"HDL\", \"LDL\", \"TRIG\", \"CHOLHDLRATIO\" in the last 76878 hours.  No results for input(s): \"LDL\" in the last 56883 hours.  Recent Labs   Lab Test 03/14/24  1711      POTASSIUM 4.0   CHLORIDE 102   CO2 " "26   *   BUN 18.0   CR 0.91   GFRESTIMATED >90   SHEELA 9.4     Recent Labs   Lab Test 03/14/24  1711 03/05/24  1813   CR 0.91 0.86     Recent Labs   Lab Test 03/05/24  1813   A1C 9.9*          Recent Labs   Lab Test 03/14/24  1711   WBC 11.1*   HGB 16.3   HCT 46.9   MCV 90        Recent Labs   Lab Test 03/14/24  1711 03/05/24  1813   HGB 16.3 16.1    No results for input(s): \"TROPONINI\" in the last 90280 hours.  Recent Labs   Lab Test 03/05/24  1813   NTBNPI 903*     No results for input(s): \"TSH\" in the last 99354 hours.  Recent Labs   Lab Test 03/14/24  1711   INR 1.10        Vesta Jones MD                                        Thank you for allowing me to participate in the care of your patient.      Sincerely,     Vesta Jones MD     [facility]  cc:   DEONTE [facility]  HEART & VASCULAR [address]  [address] [place] [address]      "

## 2024-08-14 NOTE — PATIENT INSTRUCTIONS
[facility]  Cardiac Electrophysiology  [address]  [place] [address]   Office: [phone]  Fax: [phone]       Thank you for seeing us in clinic today - it is a pleasure to be a part of your care team.  Below is a summary of our plan from today's visit.      Continue current meds  Continue efforts to lose weight  Please get an echocardiogram  Follow up in one year    Please do not hesitate to be in touch with our office at [phone] with any questions that may arise.      Thank you for trusting us with your care,    Vesta Jones MD  Clinical Cardiac Electrophysiology  [facility]  [address]  [place] [address]   Office: [phone]  Fax: [phone]

## 2024-09-13 ENCOUNTER — TELEPHONE (OUTPATIENT)
Dept: CARDIOLOGY | Facility: CLINIC | Age: 54
End: 2024-09-13
Payer: COMMERCIAL

## 2024-09-13 DIAGNOSIS — I48.91 NEW ONSET ATRIAL FIBRILLATION (H): ICD-10-CM

## 2024-09-13 NOTE — TELEPHONE ENCOUNTER
[facility] Call Center    Phone Message    May a detailed message be left on voicemail: yes     Reason for Call: Medication Refill Request    Has the patient contacted the pharmacy for the refill? Yes   Name of medication being requested: rivaroxaban ANTICOAGULANT (XARELTO) 20 MG TABS tablet   Provider who prescribed the medication: Dr. Jones  Pharmacy: [facility]Y #[id number] [address]    Date medication is needed: 09/13/24   Patient states that he has 3 pills left of this medication and the pharmacy has not heard back on refill request. Patient was told he would need to remain on this medication until 5/2025. He is leaving town today.     Action Taken: Other: cardiology    Travel Screening: Not Applicable  Thank you!  [facility]       Date of Service:

## 2024-09-27 ENCOUNTER — TELEPHONE (OUTPATIENT)
Dept: FAMILY MEDICINE | Facility: CLINIC | Age: 54
End: 2024-09-27
Payer: COMMERCIAL

## 2024-09-27 NOTE — TELEPHONE ENCOUNTER
Patient Quality Outreach    Patient is due for the following:   Diabetes -  A1C, Eye Exam, and Diabetic Follow-Up Visit  Colon Cancer Screening  Physical Preventive Adult Physical      Topic Date Due    Pneumococcal Vaccine (1 of 2 - PCV) Never done    Zoster (Shingles) Vaccine (1 of 2) Never done    Hepatitis B Vaccine (1 of 3 - 19+ 3-dose series) Never done    COVID-19 Vaccine (3 - Pfizer risk series) 11/26/2021    Flu Vaccine (1) 09/01/2024       Next Steps:   Schedule a Adult Preventative  Patient has upcoming appointment, these items will be addressed at that time.    Type of outreach:    Chart review performed, no outreach needed.      Questions for provider review:    None           Bri Sarkar MA

## 2024-09-29 ENCOUNTER — HEALTH MAINTENANCE LETTER (OUTPATIENT)
Age: 54
End: 2024-09-29

## 2024-09-30 ENCOUNTER — OFFICE VISIT (OUTPATIENT)
Dept: FAMILY MEDICINE | Facility: CLINIC | Age: 54
End: 2024-09-30
Payer: COMMERCIAL

## 2024-09-30 VITALS
HEIGHT: 69 IN | BODY MASS INDEX: 40.11 KG/M2 | SYSTOLIC BLOOD PRESSURE: 130 MMHG | WEIGHT: 270.8 LBS | HEART RATE: 67 BPM | OXYGEN SATURATION: 100 % | DIASTOLIC BLOOD PRESSURE: 73 MMHG | RESPIRATION RATE: 12 BRPM | TEMPERATURE: 97.8 F

## 2024-09-30 DIAGNOSIS — E66.01 MORBID OBESITY (H): ICD-10-CM

## 2024-09-30 DIAGNOSIS — E11.65 TYPE 2 DIABETES MELLITUS WITH HYPERGLYCEMIA, WITHOUT LONG-TERM CURRENT USE OF INSULIN (H): Primary | ICD-10-CM

## 2024-09-30 PROBLEM — R73.03 PREDIABETES: Status: RESOLVED | Noted: 2021-01-05 | Resolved: 2024-09-30

## 2024-09-30 LAB
EST. AVERAGE GLUCOSE BLD GHB EST-MCNC: 137 MG/DL
HBA1C MFR BLD: 6.4 % (ref 0–5.6)

## 2024-09-30 PROCEDURE — 36415 COLL VENOUS BLD VENIPUNCTURE: CPT | Performed by: PHYSICIAN ASSISTANT

## 2024-09-30 PROCEDURE — 99213 OFFICE O/P EST LOW 20 MIN: CPT | Performed by: PHYSICIAN ASSISTANT

## 2024-09-30 PROCEDURE — 83036 HEMOGLOBIN GLYCOSYLATED A1C: CPT | Performed by: PHYSICIAN ASSISTANT

## 2024-09-30 NOTE — PROGRESS NOTES
"  Assessment & Plan     Type 2 diabetes mellitus with hyperglycemia, without long-term current use of insulin (H)  Patient is here today for diabetes follow up. At last visit, prescribed insulin which he did not want to take. On Metformin BID.  Did speak with Diabetes Educator. Really worked on lifestyle changes, is checking glucose at home without signs or symptoms of hyperglycemia. Will check A1C today. A1C improved to 6.4.  Plan for DM check in 6 months.  Recommend formal eye exam as well.   - Hemoglobin A1c    Morbid obesity (H)  Congratulated on 20 lb weight loss. BMI improved to 39.99.  - Hemoglobin A1c            BMI  Estimated body mass index is 39.99 kg/m  as calculated from the following:    Height as of this encounter: 1.753 m (5' 9\").    Weight as of this encounter: 122.8 kg (270 lb 12.8 oz).   Weight management plan: Discussed healthy diet and exercise guidelines      Risks, benefits and alternatives were discussed with patient. Agreeable to the plan of care.      Juan Luis Monteiro is a 54 year old, presenting for the following health issues:  Diabetes and Follow Up      9/30/2024     3:24 PM   Additional Questions   Roomed by CELENA Sarkar CMA([facility])     History of Present Illness       Diabetes:   He presents for follow up of diabetes.  He is checking home blood glucose three times daily.   He checks blood glucose before meals, after meals, before and after meals and at bedtime.  Blood glucose is never over 200 and never under 70.  When his blood glucose is low, the patient is asymptomatic for confusion, blurred vision, lethargy and reports not feeling dizzy, shaky, or weak.  He is concerned about other.   He is having weight loss.  The patient has not had a diabetic eye exam in the last 12 months.          He eats 4 or more servings of fruits and vegetables daily.He consumes 0 sweetened beverage(s) daily.He exercises with enough effort to increase his heart rate 30 to 60 minutes per day.  He exercises " "with enough effort to increase his heart rate 5 days per week.   He is taking medications regularly.     AM sugar:   [employer] protein shake  Salad lunch, lean protein  2 servings veggies and protein at night  3PM sugar: 115  At night before bed: 130s  Has had 160 after pizza  CGM: 30 day average is 114, 60 day average 120  No signs or symptoms of low glucose  Did not use Lantus at all  Is walking the dog every day  Still on Metformin BID  Has lost 20lbs since June      Review of Systems  Constitutional, HEENT, cardiovascular, pulmonary, gi and gu systems are negative, except as otherwise noted.      Objective    /73   Pulse 67   Temp 97.8  F (36.6  C) (Oral)   Resp 12   Ht 1.753 m (5' 9\")   Wt 122.8 kg (270 lb 12.8 oz)   SpO2 100%   BMI 39.99 kg/m    Body mass index is 39.99 kg/m .  Physical Exam   GENERAL: alert and no distress  NECK: no adenopathy, no asymmetry, masses, or scars  RESP: lungs clear to auscultation - no rales, rhonchi or wheezes  CV: regular rate and rhythm, normal S1 S2, no S3 or S4, no murmur, click or rub, no peripheral edema  MS: no gross musculoskeletal defects noted, no edema          Signed Electronically by: Angélica Dc PA-C    "

## 2025-01-12 DIAGNOSIS — E11.9 TYPE 2 DIABETES MELLITUS WITHOUT COMPLICATION, WITHOUT LONG-TERM CURRENT USE OF INSULIN (H): ICD-10-CM

## 2025-01-18 ENCOUNTER — HEALTH MAINTENANCE LETTER (OUTPATIENT)
Age: 55
End: 2025-01-18

## 2025-04-27 ENCOUNTER — HEALTH MAINTENANCE LETTER (OUTPATIENT)
Age: 55
End: 2025-04-27

## 2025-04-30 ENCOUNTER — TELEPHONE (OUTPATIENT)
Dept: CARDIOLOGY | Facility: CLINIC | Age: 55
End: 2025-04-30
Payer: COMMERCIAL

## 2025-04-30 DIAGNOSIS — I21.4 NSTEMI (NON-ST ELEVATED MYOCARDIAL INFARCTION) (H): ICD-10-CM

## 2025-04-30 RX ORDER — ROSUVASTATIN CALCIUM 20 MG/1
20 TABLET, COATED ORAL DAILY
Qty: 30 TABLET | Refills: 0 | Status: SHIPPED | OUTPATIENT
Start: 2025-04-30

## 2025-04-30 RX ORDER — HYDROCHLOROTHIAZIDE 25 MG/1
25 TABLET ORAL DAILY
Qty: 30 TABLET | Refills: 0 | Status: SHIPPED | OUTPATIENT
Start: 2025-04-30

## 2025-04-30 RX ORDER — LOSARTAN POTASSIUM 100 MG/1
100 TABLET ORAL DAILY
Qty: 30 TABLET | Refills: 0 | Status: SHIPPED | OUTPATIENT
Start: 2025-04-30

## 2025-04-30 NOTE — TELEPHONE ENCOUNTER
EP does not manage HTN or hypercholesterolemia.     Will forward to Gen william Hannon for further refill/management as he saw pt 3/22/24.     Lucinda

## 2025-04-30 NOTE — TELEPHONE ENCOUNTER
[facility] Call Center    Phone Message    May a detailed message be left on voicemail: yes     Reason for Call: Medication Refill Request    Has the patient contacted the pharmacy for the refill? Yes   Name of medication being requested: hydrochlorothiazide (HYDRODIURIL) 25 MG tablet  rosuvastatin (CRESTOR) 20 MG tablet  losartan (COZAAR) 100 MG tablet  Provider who prescribed the medication: Dr. Jones  Pharmacy: [facility]ACY #[id number] [address]    Date medication is needed: ASAP    Pt states his medications will be put back on the shelf if his other prescriptions don't get refilled today. Please try to fill ASAP. Thank you!    Action Taken: Other: Cardiology    Travel Screening: Not Applicable    Thank you!  [facility]       Date of Service:

## 2025-04-30 NOTE — TELEPHONE ENCOUNTER
Dr Morgan, please review. Ok to continue prescriptions as prescribed until due for OV 8/2025? CMM,Rn  ____________________________________________    All 3 prescriptions filled by Dr Morgan for the past year.   Both providers out of office. Most recent OV with Dr. Jones 8/14/24. 30 day supply without refill sent til provider can address. CMM,RN

## 2025-05-18 ENCOUNTER — HEALTH MAINTENANCE LETTER (OUTPATIENT)
Age: 55
End: 2025-05-18

## 2025-06-02 DIAGNOSIS — I21.4 NSTEMI (NON-ST ELEVATED MYOCARDIAL INFARCTION) (H): ICD-10-CM

## 2025-06-02 DIAGNOSIS — I48.91 NEW ONSET ATRIAL FIBRILLATION (H): ICD-10-CM

## 2025-06-02 RX ORDER — LOSARTAN POTASSIUM 100 MG/1
100 TABLET ORAL DAILY
Qty: 90 TABLET | Refills: 0 | Status: SHIPPED | OUTPATIENT
Start: 2025-06-02

## 2025-06-02 RX ORDER — HYDROCHLOROTHIAZIDE 25 MG/1
25 TABLET ORAL DAILY
Qty: 90 TABLET | Refills: 0 | Status: SHIPPED | OUTPATIENT
Start: 2025-06-02

## 2025-06-02 RX ORDER — ROSUVASTATIN CALCIUM 20 MG/1
20 TABLET, COATED ORAL DAILY
Qty: 90 TABLET | Refills: 0 | Status: SHIPPED | OUTPATIENT
Start: 2025-06-02

## 2025-07-08 ENCOUNTER — PATIENT OUTREACH (OUTPATIENT)
Dept: CARE COORDINATION | Facility: CLINIC | Age: 55
End: 2025-07-08
Payer: COMMERCIAL

## 2025-07-28 DIAGNOSIS — E11.65 TYPE 2 DIABETES MELLITUS WITH HYPERGLYCEMIA, WITHOUT LONG-TERM CURRENT USE OF INSULIN (H): ICD-10-CM

## 2025-08-10 ENCOUNTER — HEALTH MAINTENANCE LETTER (OUTPATIENT)
Age: 55
End: 2025-08-10

## 2025-08-11 ENCOUNTER — HOSPITAL ENCOUNTER (OUTPATIENT)
Dept: CARDIOLOGY | Facility: HOSPITAL | Age: 55
Discharge: HOME | End: 2025-08-11
Attending: INTERNAL MEDICINE | Admitting: INTERNAL MEDICINE
Payer: COMMERCIAL

## 2025-08-11 DIAGNOSIS — I48.19 PERSISTENT ATRIAL FIBRILLATION (H): ICD-10-CM

## 2025-08-11 DIAGNOSIS — Z98.890 STATUS POST CATHETER ABLATION OF ATRIAL FIBRILLATION: ICD-10-CM

## 2025-08-11 LAB — LVEF ECHO: NORMAL

## 2025-08-11 PROCEDURE — C8929 TTE W OR WO FOL WCON,DOPPLER: HCPCS

## 2025-08-11 PROCEDURE — 93306 TTE W/DOPPLER COMPLETE: CPT | Mod: 26 | Performed by: INTERNAL MEDICINE

## 2025-08-11 PROCEDURE — 255N000002 HC RX 255 OP 636: Performed by: INTERNAL MEDICINE

## 2025-08-11 RX ADMIN — PERFLUTREN 3 ML (DILUTED): 6.52 INJECTION, SUSPENSION INTRAVENOUS at 15:36

## 2025-08-12 ENCOUNTER — OFFICE VISIT (OUTPATIENT)
Dept: CARDIOLOGY | Facility: CLINIC | Age: 55
End: 2025-08-12
Attending: INTERNAL MEDICINE
Payer: COMMERCIAL

## 2025-08-12 VITALS
DIASTOLIC BLOOD PRESSURE: 66 MMHG | HEIGHT: 69 IN | WEIGHT: 245 LBS | HEART RATE: 67 BPM | BODY MASS INDEX: 36.29 KG/M2 | SYSTOLIC BLOOD PRESSURE: 124 MMHG | RESPIRATION RATE: 16 BRPM

## 2025-08-12 DIAGNOSIS — Z98.890 STATUS POST CATHETER ABLATION OF ATRIAL FIBRILLATION: ICD-10-CM

## 2025-08-12 DIAGNOSIS — I48.19 PERSISTENT ATRIAL FIBRILLATION (H): ICD-10-CM

## 2025-08-12 PROCEDURE — 3074F SYST BP LT 130 MM HG: CPT | Performed by: INTERNAL MEDICINE

## 2025-08-12 PROCEDURE — 99214 OFFICE O/P EST MOD 30 MIN: CPT | Performed by: INTERNAL MEDICINE

## 2025-08-12 PROCEDURE — 3078F DIAST BP <80 MM HG: CPT | Performed by: INTERNAL MEDICINE

## 2025-09-02 ENCOUNTER — TELEPHONE (OUTPATIENT)
Dept: CARDIOLOGY | Facility: CLINIC | Age: 55
End: 2025-09-02
Payer: COMMERCIAL

## 2025-09-02 DIAGNOSIS — I48.91 NEW ONSET ATRIAL FIBRILLATION (H): ICD-10-CM

## 2025-09-02 DIAGNOSIS — I21.4 NSTEMI (NON-ST ELEVATED MYOCARDIAL INFARCTION) (H): ICD-10-CM

## 2025-09-02 RX ORDER — ROSUVASTATIN CALCIUM 20 MG/1
20 TABLET, COATED ORAL DAILY
Qty: 90 TABLET | Refills: 3 | Status: SHIPPED | OUTPATIENT
Start: 2025-09-02

## 2025-09-02 RX ORDER — HYDROCHLOROTHIAZIDE 25 MG/1
25 TABLET ORAL DAILY
Qty: 90 TABLET | Refills: 3 | Status: SHIPPED | OUTPATIENT
Start: 2025-09-02

## 2025-09-02 RX ORDER — LOSARTAN POTASSIUM 100 MG/1
100 TABLET ORAL DAILY
Qty: 90 TABLET | Refills: 3 | Status: SHIPPED | OUTPATIENT
Start: 2025-09-02

## (undated) DEVICE — CATHETER OCTARAY STANDARD SPLINE CURVE F 2-2-2-2-2 D160904

## (undated) DEVICE — PATCH CARTO 3 EXTERNAL REFERENCE 3D MAPPING CREFP6

## (undated) DEVICE — SHEATH PINNACLE 9/25/038 RSS905

## (undated) DEVICE — TUBE SET SMARKABLATE IRRIGATION

## (undated) DEVICE — CUSTOM PACK EP

## (undated) DEVICE — GUIDEWIRE JTIP 3MM .035 180CM IQ35F180J3

## (undated) DEVICE — PROBE TEMP CIRCA S-CATH M ESPH 12-SNSR 3D MAP CS-21EP

## (undated) DEVICE — INTRO TERUMO 8FRX25CM W/MARKER RSB803

## (undated) DEVICE — TRANSDUCER TRAY ARTERIAL 42646-06

## (undated) DEVICE — INTRO MICRO MINI STICK 4FR STIFF NITINOL 45-753

## (undated) DEVICE — 8F SOUNDSTAR ECO ULTRASOUND CATHETER

## (undated) DEVICE — CATH EP 7FR X 115CM DECANAV CA

## (undated) DEVICE — ELECTRODE DEFIB CADENCE 22550R

## (undated) DEVICE — INTRODUCER SHEATH VASC CATH 8.5FR CARTO GIDE STH MED D138502

## (undated) DEVICE — CATH ABLATION 8FR 115CM D-F CURVE BI-DIR QDOT MICRO D139505

## (undated) DEVICE — CATH TRANSSEPTAL VERSACROSS 8.5FR 180-J RF 63CM 45DEG

## (undated) RX ORDER — PROPOFOL 10 MG/ML
INJECTION, EMULSION INTRAVENOUS
Status: DISPENSED
Start: 2024-05-23

## (undated) RX ORDER — HEPARIN SODIUM 10000 [USP'U]/100ML
INJECTION, SOLUTION INTRAVENOUS
Status: DISPENSED
Start: 2024-05-23

## (undated) RX ORDER — PROTAMINE SULFATE 10 MG/ML
INJECTION, SOLUTION INTRAVENOUS
Status: DISPENSED
Start: 2024-05-23

## (undated) RX ORDER — ESMOLOL HYDROCHLORIDE 10 MG/ML
INJECTION INTRAVENOUS
Status: DISPENSED
Start: 2024-05-23

## (undated) RX ORDER — DEXAMETHASONE SODIUM PHOSPHATE 10 MG/ML
INJECTION, SOLUTION INTRAMUSCULAR; INTRAVENOUS
Status: DISPENSED
Start: 2024-05-23

## (undated) RX ORDER — PHENYLEPHRINE HYDROCHLORIDE 10 MG/ML
INJECTION INTRAVENOUS
Status: DISPENSED
Start: 2024-05-23

## (undated) RX ORDER — FENTANYL CITRATE-0.9 % NACL/PF 10 MCG/ML
PLASTIC BAG, INJECTION (ML) INTRAVENOUS
Status: DISPENSED
Start: 2024-05-23

## (undated) RX ORDER — FENTANYL CITRATE 50 UG/ML
INJECTION, SOLUTION INTRAMUSCULAR; INTRAVENOUS
Status: DISPENSED
Start: 2024-05-23

## (undated) RX ORDER — ONDANSETRON 2 MG/ML
INJECTION INTRAMUSCULAR; INTRAVENOUS
Status: DISPENSED
Start: 2024-05-23